# Patient Record
Sex: MALE | Race: WHITE
[De-identification: names, ages, dates, MRNs, and addresses within clinical notes are randomized per-mention and may not be internally consistent; named-entity substitution may affect disease eponyms.]

---

## 2017-12-19 ENCOUNTER — HOSPITAL ENCOUNTER (EMERGENCY)
Dept: HOSPITAL 8 - ED | Age: 42
Discharge: HOME | End: 2017-12-19
Payer: COMMERCIAL

## 2017-12-19 VITALS — BODY MASS INDEX: 33.09 KG/M2 | HEIGHT: 72 IN | WEIGHT: 244.27 LBS

## 2017-12-19 VITALS — DIASTOLIC BLOOD PRESSURE: 84 MMHG | SYSTOLIC BLOOD PRESSURE: 131 MMHG

## 2017-12-19 DIAGNOSIS — R07.89: Primary | ICD-10-CM

## 2017-12-19 DIAGNOSIS — Z88.0: ICD-10-CM

## 2017-12-19 LAB
AST SERPL-CCNC: 39 U/L (ref 15–37)
BUN SERPL-MCNC: 16 MG/DL (ref 7–18)
HCT VFR BLD CALC: 46.3 % (ref 39.2–51.8)
HGB BLD-MCNC: 16.1 G/DL (ref 13.7–18)
IS PT STATUS REG ER OR PRE ER?: YES
WBC # BLD AUTO: 7.7 X10^3/UL (ref 3.4–10)

## 2017-12-19 PROCEDURE — 93005 ELECTROCARDIOGRAM TRACING: CPT

## 2017-12-19 PROCEDURE — 36415 COLL VENOUS BLD VENIPUNCTURE: CPT

## 2017-12-19 PROCEDURE — 99285 EMERGENCY DEPT VISIT HI MDM: CPT

## 2017-12-19 PROCEDURE — 83690 ASSAY OF LIPASE: CPT

## 2017-12-19 PROCEDURE — 85025 COMPLETE CBC W/AUTO DIFF WBC: CPT

## 2017-12-19 PROCEDURE — 84484 ASSAY OF TROPONIN QUANT: CPT

## 2017-12-19 PROCEDURE — 80053 COMPREHEN METABOLIC PANEL: CPT

## 2021-10-19 ENCOUNTER — TELEPHONE (OUTPATIENT)
Dept: SCHEDULING | Facility: IMAGING CENTER | Age: 46
End: 2021-10-19

## 2021-11-22 ENCOUNTER — OFFICE VISIT (OUTPATIENT)
Dept: MEDICAL GROUP | Facility: MEDICAL CENTER | Age: 46
End: 2021-11-22
Payer: COMMERCIAL

## 2021-11-22 VITALS
OXYGEN SATURATION: 92 % | SYSTOLIC BLOOD PRESSURE: 140 MMHG | BODY MASS INDEX: 34.13 KG/M2 | TEMPERATURE: 97.7 F | HEART RATE: 72 BPM | HEIGHT: 72 IN | DIASTOLIC BLOOD PRESSURE: 90 MMHG | WEIGHT: 252 LBS

## 2021-11-22 DIAGNOSIS — Z87.39 HISTORY OF GOUT: ICD-10-CM

## 2021-11-22 DIAGNOSIS — Z23 NEED FOR VACCINATION: ICD-10-CM

## 2021-11-22 DIAGNOSIS — K43.9 ABDOMINAL WALL HERNIA: ICD-10-CM

## 2021-11-22 DIAGNOSIS — Z13.6 SCREENING FOR CARDIOVASCULAR CONDITION: ICD-10-CM

## 2021-11-22 DIAGNOSIS — Z87.898 HISTORY OF HEARTBURN: ICD-10-CM

## 2021-11-22 DIAGNOSIS — Z00.00 WELLNESS EXAMINATION: ICD-10-CM

## 2021-11-22 DIAGNOSIS — I10 PRIMARY HYPERTENSION: ICD-10-CM

## 2021-11-22 PROCEDURE — 99214 OFFICE O/P EST MOD 30 MIN: CPT | Mod: 25 | Performed by: PHYSICIAN ASSISTANT

## 2021-11-22 PROCEDURE — 90471 IMMUNIZATION ADMIN: CPT | Performed by: PHYSICIAN ASSISTANT

## 2021-11-22 PROCEDURE — 90686 IIV4 VACC NO PRSV 0.5 ML IM: CPT | Performed by: PHYSICIAN ASSISTANT

## 2021-11-22 RX ORDER — CEFDINIR 300 MG/1
CAPSULE ORAL
COMMUNITY
Start: 2021-10-21 | End: 2021-11-22

## 2021-11-22 RX ORDER — ALBUTEROL SULFATE 90 UG/1
AEROSOL, METERED RESPIRATORY (INHALATION)
COMMUNITY
Start: 2021-10-19 | End: 2021-11-22

## 2021-11-22 RX ORDER — AZITHROMYCIN 250 MG/1
TABLET, FILM COATED ORAL
COMMUNITY
Start: 2021-10-19 | End: 2021-11-22

## 2021-11-22 RX ORDER — OMEPRAZOLE 20 MG/1
20 CAPSULE, DELAYED RELEASE ORAL 2 TIMES DAILY
COMMUNITY

## 2021-11-22 RX ORDER — ALBUTEROL SULFATE 2.5 MG/3ML
SOLUTION RESPIRATORY (INHALATION)
COMMUNITY
Start: 2021-10-21 | End: 2021-11-22

## 2021-11-22 RX ORDER — LISINOPRIL 10 MG/1
10 TABLET ORAL DAILY
COMMUNITY
End: 2021-11-22 | Stop reason: SDUPTHER

## 2021-11-22 RX ORDER — LISINOPRIL 10 MG/1
10 TABLET ORAL DAILY
Qty: 30 TABLET | Refills: 0 | Status: SHIPPED | OUTPATIENT
Start: 2021-11-22 | End: 2021-12-16 | Stop reason: SDUPTHER

## 2021-11-22 ASSESSMENT — ANXIETY QUESTIONNAIRES
3. WORRYING TOO MUCH ABOUT DIFFERENT THINGS: NOT AT ALL
7. FEELING AFRAID AS IF SOMETHING AWFUL MIGHT HAPPEN: NOT AT ALL
2. NOT BEING ABLE TO STOP OR CONTROL WORRYING: NOT AT ALL
4. TROUBLE RELAXING: NOT AT ALL
1. FEELING NERVOUS, ANXIOUS, OR ON EDGE: NOT AT ALL
5. BEING SO RESTLESS THAT IT IS HARD TO SIT STILL: NOT AT ALL
6. BECOMING EASILY ANNOYED OR IRRITABLE: NOT AT ALL
GAD7 TOTAL SCORE: 0
IF YOU CHECKED OFF ANY PROBLEMS ON THIS QUESTIONNAIRE, HOW DIFFICULT HAVE THESE PROBLEMS MADE IT FOR YOU TO DO YOUR WORK, TAKE CARE OF THINGS AT HOME, OR GET ALONG WITH OTHER PEOPLE: NOT DIFFICULT AT ALL

## 2021-11-22 ASSESSMENT — PATIENT HEALTH QUESTIONNAIRE - PHQ9: CLINICAL INTERPRETATION OF PHQ2 SCORE: 0

## 2021-11-22 NOTE — ASSESSMENT & PLAN NOTE
Dx with HTN about 3 years ago. Runs in the family. Took medication for about 1.5 years. Ran out about 18 months ago. No headache, dizziness. No chest pain, SOB or difficulty breathing. Would like to restart medication.

## 2021-11-22 NOTE — ASSESSMENT & PLAN NOTE
Prescribed omeprazole at 20mg daily. Did go to [facility] and had upper endoscopy few years ago. Was told it was abnormal - a lot of inflammation but no Narayanan's esophagus. At one point got gout, once in foot, once in knee, once in big toe. Worried that maybe the omeprazole could have caused the gout attack. Started taking tart cherry extract gummies - last gout attack was June 2021.

## 2021-11-22 NOTE — LETTER
[facility]  Fabi Hurd P.A.-C.  [address]  [place] [address]  Fax: [phone]   Authorization for Release/Disclosure of   Protected Health Information   Name: JAE ROONEY : 1975 SSN: [id number]   Address: [address] [place] [address] [place] [address] Phone:    [phone] (home)    I authorize the entity listed below to release/disclose the PHI below to:   [facility]/Fabi Hurd P.A.-C. and Fabi Hurd P.A.-C.   Provider or Entity Name:     Address   City, State, Zip   Phone:      Fax:     Reason for request: continuity of care   Information to be released:    [  ] LAST COLONOSCOPY,  including any PATH REPORT and follow-up  [  ] LAST FIT/COLOGUARD RESULT [  ] LAST DEXA  [  ] LAST MAMMOGRAM  [  ] LAST PAP  [  ] LAST LABS [  ] RETINA EXAM REPORT  [  ] IMMUNIZATION RECORDS  [  ] Release all info      [  ] Check here and initial the line next to each item to release ALL health information INCLUDING  _____ Care and treatment for drug and / or alcohol abuse  _____ HIV testing, infection status, or AIDS  _____ Genetic Testing    DATES OF SERVICE OR TIME PERIOD TO BE DISCLOSED: _____________  I understand and acknowledge that:  * This Authorization may be revoked at any time by you in writing, except if your health information has already been used or disclosed.  * Your health information that will be used or disclosed as a result of you signing this authorization could be re-disclosed by the recipient. If this occurs, your re-disclosed health information may no longer be protected by State or Federal laws.  * You may refuse to sign this Authorization. Your refusal will not affect your ability to obtain treatment.  * This Authorization becomes effective upon signing and will  on (date) __________.      If no date is indicated, this Authorization will  one (1) year from the signature date.    Name: Jae Rooney    Signature:   Date:     2021       PLEASE FAX REQUESTED  RECORDS BACK TO: [phone]

## 2021-11-22 NOTE — LETTER
[facility]  Fabi Hurd P.A.-C.  [address]  [place] [address]  Fax: [phone]   Authorization for Release/Disclosure of   Protected Health Information   Name: JAE ROONEY : 1975 SSN: [id number]   Address: [address] [place] [address] [place] [address] Phone:    [phone] (home)    I authorize the entity listed below to release/disclose the PHI below to:   [facility]/Fabi Hurd P.A.-C. and Fabi Hurd P.A.-C.   Provider or Entity Name:     Address   City, State, Zip   Phone:      Fax:     Reason for request: continuity of care   Information to be released:    [  ] LAST COLONOSCOPY,  including any PATH REPORT and follow-up  [  ] LAST FIT/COLOGUARD RESULT [  ] LAST DEXA  [  ] LAST MAMMOGRAM  [  ] LAST PAP  [  ] LAST LABS [  ] RETINA EXAM REPORT  [  ] IMMUNIZATION RECORDS  [  ] Release all info      [  ] Check here and initial the line next to each item to release ALL health information INCLUDING  _____ Care and treatment for drug and / or alcohol abuse  _____ HIV testing, infection status, or AIDS  _____ Genetic Testing    DATES OF SERVICE OR TIME PERIOD TO BE DISCLOSED: _____________  I understand and acknowledge that:  * This Authorization may be revoked at any time by you in writing, except if your health information has already been used or disclosed.  * Your health information that will be used or disclosed as a result of you signing this authorization could be re-disclosed by the recipient. If this occurs, your re-disclosed health information may no longer be protected by State or Federal laws.  * You may refuse to sign this Authorization. Your refusal will not affect your ability to obtain treatment.  * This Authorization becomes effective upon signing and will  on (date) __________.      If no date is indicated, this Authorization will  one (1) year from the signature date.    Name: Jae Rooney    Signature:   Date:     2021       PLEASE FAX REQUESTED  RECORDS BACK TO: [phone]

## 2021-11-23 NOTE — PROGRESS NOTES
Chief Complaint   Patient presents with   • Establish Care     Hx of Htn, requesting to re-start Lisinopril   • Medication Management     Taking Omeprazole, has concerns it may be causing gout flare-up   • Requesting Labs     Annual, check kidney function       HISTORY OF THE PRESENT ILLNESS: This is a 46 y.o. male new patient to our practice. This pleasant patient is here today to establish care. Wife is seen in our office. Works for a [employer] as a . Non-smoker.     Primary hypertension  Dx with HTN about 3 years ago. Runs in the family. Took medication for about 1.5 years. Ran out about 18 months ago. No headache, dizziness. No chest pain, SOB or difficulty breathing. Would like to restart medication.    History of heartburn  Prescribed omeprazole at 20mg daily. Did go to [facility] and had upper endoscopy few years ago. Was told it was abnormal - a lot of inflammation but no Narayanan's esophagus. At one point got gout, once in foot, once in knee, once in big toe. Worried that maybe the omeprazole could have caused the gout attack. Started taking tart cherry extract gummies - last gout attack was 2021.     Abdominal wall hernia  Years ago, lifting heavy luggage, tear/hernia upper abdomen, not painful      Past Medical History:   Diagnosis Date   • Glaucoma    • Gout    • Hyperlipidemia    • Hypertension        Past Surgical History:   Procedure Laterality Date   • ANAL FISTULOTOMY     • CHOLECYSTECTOMY         Family Status   Relation Name Status   • Mo  Alive        Melanoma   • Fa          Skin   • Sis  Alive   • Bro  Alive     Family History   Problem Relation Age of Onset   • Hypertension Mother    • Cancer Mother    • Cancer Father        Social History     Tobacco Use   • Smoking status: Never Smoker   • Smokeless tobacco: Never Used   Vaping Use   • Vaping Use: Never used   Substance Use Topics   • Alcohol use: Yes     Comment: 2-3 times a week   • Drug use:  "Never       Allergies: Other environmental and Penicillins    Current Outpatient Medications Ordered in Epic   Medication Sig Dispense Refill   • omeprazole (PRILOSEC) 20 MG delayed-release capsule Take 20 mg by mouth 2 times a day.     • lisinopril (PRINIVIL) 10 MG Tab Take 1 Tablet by mouth every day. 30 Tablet 0     No current Epic-ordered facility-administered medications on file.       Review of Systems   Constitutional: Negative for fever, chills, weight loss and malaise/fatigue.   HENT: Negative for ear pain, nosebleeds, congestion, sore throat and neck pain.    Eyes: Negative for blurred vision.   Respiratory: Negative for cough, sputum production, shortness of breath and wheezing.    Cardiovascular: Negative for chest pain, palpitations, orthopnea and leg swelling.   Gastrointestinal: Negative for heartburn, nausea, vomiting and abdominal pain.   Genitourinary: Negative for dysuria, urgency and frequency.   Musculoskeletal: Negative for myalgias, back pain and joint pain.   Skin: Negative for rash and itching.   Neurological: Negative for dizziness, tingling, tremors, sensory change, focal weakness and headaches.   Endo/Heme/Allergies: Does not bruise/bleed easily.   Psychiatric/Behavioral: Negative for depression, anxiety, or memory loss.     All other systems reviewed and are negative except as in HPI.    Exam: /90   Pulse 72   Temp 36.5 °C (97.7 °F) (Temporal)   Ht 1.816 m (5' 11.5\")   Wt 114 kg (252 lb)   SpO2 92%   General: Normal appearing. No distress.  Neck:  Thyroid is not enlarged.  Pulmonary: Clear to ausculation.  Normal effort. No rales, ronchi, or wheezing.  Cardiovascular: Regular rate and rhythm without murmur. Carotid and radial pulses are intact and equal bilaterally.  Skin: Warm and dry.  No obvious lesions.  Musculoskeletal: Normal gait. No extremity cyanosis, clubbing, or edema.  Psych: Normal mood and affect. Alert and oriented x3. Judgment and insight is " normal.      Assessment/Plan  1. Primary hypertension  lisinopril (PRINIVIL) 10 MG Tab   2. Need for vaccination  INFLUENZA VACCINE QUAD INJ (PF)   3. History of heartburn     4. Wellness examination  CBC WITH DIFFERENTIAL    Comp Metabolic Panel    TSH WITH REFLEX TO FT4   5. Screening for cardiovascular condition  Lipid Profile   6. History of gout  URIC ACID   7. Abdominal wall hernia

## 2021-12-01 ENCOUNTER — HOSPITAL ENCOUNTER (OUTPATIENT)
Dept: LAB | Facility: MEDICAL CENTER | Age: 46
End: 2021-12-01
Attending: PHYSICIAN ASSISTANT
Payer: COMMERCIAL

## 2021-12-01 DIAGNOSIS — Z87.39 HISTORY OF GOUT: ICD-10-CM

## 2021-12-01 DIAGNOSIS — Z00.00 WELLNESS EXAMINATION: ICD-10-CM

## 2021-12-01 DIAGNOSIS — Z13.6 SCREENING FOR CARDIOVASCULAR CONDITION: ICD-10-CM

## 2021-12-01 LAB
ALBUMIN SERPL BCP-MCNC: 5.2 G/DL (ref 3.2–4.9)
ALBUMIN/GLOB SERPL: 2.4 G/DL
ALP SERPL-CCNC: 72 U/L (ref 30–99)
ANION GAP SERPL CALC-SCNC: 15 MMOL/L (ref 7–16)
BILIRUB SERPL-MCNC: 0.6 MG/DL (ref 0.1–1.5)
BUN SERPL-MCNC: 14 MG/DL (ref 8–22)
CALCIUM SERPL-MCNC: 9.1 MG/DL (ref 8.5–10.5)
CHLORIDE SERPL-SCNC: 103 MMOL/L (ref 96–112)
CHOLEST SERPL-MCNC: 260 MG/DL (ref 100–199)
CO2 SERPL-SCNC: 21 MMOL/L (ref 20–33)
CREAT SERPL-MCNC: 0.83 MG/DL (ref 0.5–1.4)
FASTING STATUS PATIENT QL REPORTED: NORMAL
GLOBULIN SER CALC-MCNC: 2.2 G/DL (ref 1.9–3.5)
GLUCOSE SERPL-MCNC: 104 MG/DL (ref 65–99)
HDLC SERPL-MCNC: 35 MG/DL
LDLC SERPL CALC-MCNC: 156 MG/DL
PROT SERPL-MCNC: 7.4 G/DL (ref 6–8.2)
SODIUM SERPL-SCNC: 139 MMOL/L (ref 135–145)
TRIGL SERPL-MCNC: 345 MG/DL (ref 0–149)
TSH SERPL DL<=0.005 MIU/L-ACNC: 2.96 UIU/ML (ref 0.38–5.33)
URATE SERPL-MCNC: 9 MG/DL (ref 2.5–8.3)

## 2021-12-01 PROCEDURE — 80061 LIPID PANEL: CPT

## 2021-12-01 PROCEDURE — 36415 COLL VENOUS BLD VENIPUNCTURE: CPT

## 2021-12-01 PROCEDURE — 84443 ASSAY THYROID STIM HORMONE: CPT

## 2021-12-01 PROCEDURE — 80053 COMPREHEN METABOLIC PANEL: CPT

## 2021-12-01 PROCEDURE — 85025 COMPLETE CBC W/AUTO DIFF WBC: CPT

## 2021-12-01 PROCEDURE — 84550 ASSAY OF BLOOD/URIC ACID: CPT

## 2021-12-09 ENCOUNTER — HOSPITAL ENCOUNTER (OUTPATIENT)
Dept: LAB | Facility: MEDICAL CENTER | Age: 46
End: 2021-12-09
Attending: PHYSICIAN ASSISTANT
Payer: COMMERCIAL

## 2021-12-09 LAB
ALT SERPL-CCNC: 114 U/L (ref 2–50)
AST SERPL-CCNC: 42 U/L (ref 12–45)
BASOPHILS # BLD AUTO: 0.8 % (ref 0–1.8)
BASOPHILS # BLD: 0.06 K/UL (ref 0–0.12)
EOSINOPHIL # BLD AUTO: 0.34 K/UL (ref 0–0.51)
EOSINOPHIL NFR BLD: 4.5 % (ref 0–6.9)
ERYTHROCYTE [DISTWIDTH] IN BLOOD BY AUTOMATED COUNT: 44.4 FL (ref 35.9–50)
HCT VFR BLD AUTO: 45.7 % (ref 42–52)
HGB BLD-MCNC: 15.7 G/DL (ref 14–18)
IMM GRANULOCYTES # BLD AUTO: 0.04 K/UL (ref 0–0.11)
IMM GRANULOCYTES NFR BLD AUTO: 0.5 % (ref 0–0.9)
LYMPHOCYTES # BLD AUTO: 2.39 K/UL (ref 1–4.8)
LYMPHOCYTES NFR BLD: 31.7 % (ref 22–41)
MCH RBC QN AUTO: 31.7 PG (ref 27–33)
MCHC RBC AUTO-ENTMCNC: 34.4 G/DL (ref 33.7–35.3)
MCV RBC AUTO: 92.1 FL (ref 81.4–97.8)
MONOCYTES # BLD AUTO: 0.57 K/UL (ref 0–0.85)
MONOCYTES NFR BLD AUTO: 7.6 % (ref 0–13.4)
NEUTROPHILS # BLD AUTO: 4.14 K/UL (ref 1.82–7.42)
NEUTROPHILS NFR BLD: 54.9 % (ref 44–72)
NRBC # BLD AUTO: 0 K/UL
NRBC BLD-RTO: 0 /100 WBC
PLATELET # BLD AUTO: 234 K/UL (ref 164–446)
PMV BLD AUTO: 9.9 FL (ref 9–12.9)
POTASSIUM SERPL-SCNC: 4.5 MMOL/L (ref 3.6–5.5)
RBC # BLD AUTO: 4.96 M/UL (ref 4.7–6.1)
WBC # BLD AUTO: 7.5 K/UL (ref 4.8–10.8)

## 2021-12-09 PROCEDURE — 84450 TRANSFERASE (AST) (SGOT): CPT

## 2021-12-09 PROCEDURE — 85025 COMPLETE CBC W/AUTO DIFF WBC: CPT

## 2021-12-09 PROCEDURE — 84460 ALANINE AMINO (ALT) (SGPT): CPT

## 2021-12-09 PROCEDURE — 84132 ASSAY OF SERUM POTASSIUM: CPT

## 2021-12-15 ENCOUNTER — OFFICE VISIT (OUTPATIENT)
Dept: MEDICAL GROUP | Facility: MEDICAL CENTER | Age: 46
End: 2021-12-15
Payer: COMMERCIAL

## 2021-12-15 VITALS
BODY MASS INDEX: 34.44 KG/M2 | TEMPERATURE: 95.2 F | HEIGHT: 71 IN | OXYGEN SATURATION: 93 % | HEART RATE: 98 BPM | SYSTOLIC BLOOD PRESSURE: 116 MMHG | WEIGHT: 246 LBS | DIASTOLIC BLOOD PRESSURE: 78 MMHG

## 2021-12-15 DIAGNOSIS — I10 PRIMARY HYPERTENSION: ICD-10-CM

## 2021-12-15 DIAGNOSIS — Z91.89 OTHER SPECIFIED PERSONAL RISK FACTORS, NOT ELSEWHERE CLASSIFIED: ICD-10-CM

## 2021-12-15 DIAGNOSIS — Z82.49 FAMILY HISTORY OF HEART DISEASE: ICD-10-CM

## 2021-12-15 DIAGNOSIS — E79.0 ELEVATED URIC ACID IN BLOOD: ICD-10-CM

## 2021-12-15 DIAGNOSIS — Z87.39 HISTORY OF GOUT: ICD-10-CM

## 2021-12-15 DIAGNOSIS — R73.9 BLOOD GLUCOSE ELEVATED: ICD-10-CM

## 2021-12-15 DIAGNOSIS — E78.2 MIXED HYPERLIPIDEMIA: ICD-10-CM

## 2021-12-15 PROCEDURE — 99214 OFFICE O/P EST MOD 30 MIN: CPT | Performed by: PHYSICIAN ASSISTANT

## 2021-12-15 RX ORDER — NAPROXEN 375 MG/1
375 TABLET ORAL 2 TIMES DAILY WITH MEALS
Qty: 60 TABLET | Refills: 0 | Status: SHIPPED | OUTPATIENT
Start: 2021-12-15 | End: 2022-04-07

## 2021-12-15 RX ORDER — ALLOPURINOL 100 MG/1
100 TABLET ORAL DAILY
Qty: 30 TABLET | Refills: 2 | Status: SHIPPED
Start: 2021-12-15 | End: 2022-04-07

## 2021-12-15 ASSESSMENT — FIBROSIS 4 INDEX: FIB4 SCORE: 0.74

## 2021-12-15 NOTE — ASSESSMENT & PLAN NOTE
Ref Range & Units 2 wk ago   Cholesterol,Tot 100 - 199 mg/dL 260 High     Triglycerides 0 - 149 mg/dL 345 High     HDL >=40 mg/dL 35 Abnormal     LDL <100 mg/dL 156      Dad had bypass surgery around age 55-57, will check ct calcium scoring then f/u with discussion on statin

## 2021-12-15 NOTE — PROGRESS NOTES
"Subjective:   Jae Cedillo is a 46 y.o. male here today for f/u HTN, lab review    Chief Complaint   Patient presents with   • Hypertension     Follow up       Elevated uric acid in blood  9.0. h/o gout attacks. Will start allopurinol and naproxen, repeat uric acid 1-2 months    Mixed hyperlipidemia   Ref Range & Units 2 wk ago   Cholesterol,Tot 100 - 199 mg/dL 260 High     Triglycerides 0 - 149 mg/dL 345 High     HDL >=40 mg/dL 35 Abnormal     LDL <100 mg/dL 156      Dad had bypass surgery around age 55-57, will check ct calcium scoring then f/u with discussion on statin    Primary hypertension  Improved on current, recent gfr, bun and creatinine wnl    Blood glucose elevated  104, will check HgA1C in 3-6 months       Current medicines (including changes today)  Current Outpatient Medications   Medication Sig Dispense Refill   • allopurinol (ZYLOPRIM) 100 MG Tab Take 1 Tablet by mouth every day. 30 Tablet 2   • naproxen (NAPROSYN) 375 MG Tab Take 1 Tablet by mouth 2 times a day with meals. 60 Tablet 0   • omeprazole (PRILOSEC) 20 MG delayed-release capsule Take 20 mg by mouth 2 times a day.     • lisinopril (PRINIVIL) 10 MG Tab Take 1 Tablet by mouth every day. 30 Tablet 0     No current facility-administered medications for this visit.     He  has a past medical history of Glaucoma, Gout, Hyperlipidemia, and Hypertension.    ROS   No fever/chills. No weight change. No headache/dizziness. No focal weakness. No sore throat, nasal congestion, ear pain. No chest pain, no shortness of breath, difficulty breathing. No n/v/d/c or abdominal pain. No urinary complaint. No rash or skin lesion. No joint pain or swelling.       Objective:     /78 (BP Location: Left arm, Patient Position: Sitting, BP Cuff Size: Adult long)   Pulse 98   Temp (!) 35.1 °C (95.2 °F) (Temporal)   Ht 1.816 m (5' 11.5\")   Wt 112 kg (246 lb)   SpO2 93%  Body mass index is 33.84 kg/m².   Physical Exam:  Constitutional: WDWN, NAD  Skin: Warm, " dry, good turgor, no rashes in visible areas.  Psych: Alert and oriented x3, normal affect and mood.    Assessment and Plan:   The following treatment plan was discussed    1. Elevated uric acid in blood    - allopurinol (ZYLOPRIM) 100 MG Tab; Take 1 Tablet by mouth every day.  Dispense: 30 Tablet; Refill: 2  - naproxen (NAPROSYN) 375 MG Tab; Take 1 Tablet by mouth 2 times a day with meals.  Dispense: 60 Tablet; Refill: 0  - URIC ACID; Future    2. Mixed hyperlipidemia    - CT-HEART W/O CONT EVAL CALCIUM; Future    3. Blood glucose elevated      4. Other specified personal risk factors, not elsewhere classified    - CT-HEART W/O CONT EVAL CALCIUM; Future    5. Family history of heart disease    - allopurinol (ZYLOPRIM) 100 MG Tab; Take 1 Tablet by mouth every day.  Dispense: 30 Tablet; Refill: 2  - naproxen (NAPROSYN) 375 MG Tab; Take 1 Tablet by mouth 2 times a day with meals.  Dispense: 60 Tablet; Refill: 0  - URIC ACID; Future  - CT-HEART W/O CONT EVAL CALCIUM; Future    6. History of gout    - allopurinol (ZYLOPRIM) 100 MG Tab; Take 1 Tablet by mouth every day.  Dispense: 30 Tablet; Refill: 2  - naproxen (NAPROSYN) 375 MG Tab; Take 1 Tablet by mouth 2 times a day with meals.  Dispense: 60 Tablet; Refill: 0  - URIC ACID; Future    7. Primary hypertension    Diet and exercise discussed    Followup: Return in about 3 months (around 3/15/2022).

## 2021-12-16 DIAGNOSIS — I10 PRIMARY HYPERTENSION: ICD-10-CM

## 2021-12-17 RX ORDER — LISINOPRIL 10 MG/1
10 TABLET ORAL DAILY
Qty: 90 TABLET | Refills: 3 | Status: SHIPPED | OUTPATIENT
Start: 2021-12-17 | End: 2022-05-20 | Stop reason: SDUPTHER

## 2022-04-05 ENCOUNTER — HOSPITAL ENCOUNTER (OUTPATIENT)
Dept: LAB | Facility: MEDICAL CENTER | Age: 47
End: 2022-04-05
Attending: PHYSICIAN ASSISTANT
Payer: COMMERCIAL

## 2022-04-05 DIAGNOSIS — E79.0 ELEVATED URIC ACID IN BLOOD: ICD-10-CM

## 2022-04-05 DIAGNOSIS — Z87.39 HISTORY OF GOUT: ICD-10-CM

## 2022-04-05 DIAGNOSIS — Z82.49 FAMILY HISTORY OF HEART DISEASE: ICD-10-CM

## 2022-04-05 LAB — URATE SERPL-MCNC: 7.7 MG/DL (ref 2.5–8.3)

## 2022-04-05 PROCEDURE — 84550 ASSAY OF BLOOD/URIC ACID: CPT

## 2022-04-05 PROCEDURE — 36415 COLL VENOUS BLD VENIPUNCTURE: CPT

## 2022-04-07 ENCOUNTER — OFFICE VISIT (OUTPATIENT)
Dept: MEDICAL GROUP | Facility: MEDICAL CENTER | Age: 47
End: 2022-04-07
Payer: COMMERCIAL

## 2022-04-07 VITALS
HEART RATE: 88 BPM | SYSTOLIC BLOOD PRESSURE: 136 MMHG | OXYGEN SATURATION: 95 % | DIASTOLIC BLOOD PRESSURE: 80 MMHG | BODY MASS INDEX: 34.19 KG/M2 | HEIGHT: 71 IN | WEIGHT: 244.2 LBS | TEMPERATURE: 97.4 F

## 2022-04-07 DIAGNOSIS — I10 PRIMARY HYPERTENSION: ICD-10-CM

## 2022-04-07 DIAGNOSIS — K21.9 GASTROESOPHAGEAL REFLUX DISEASE, UNSPECIFIED WHETHER ESOPHAGITIS PRESENT: ICD-10-CM

## 2022-04-07 DIAGNOSIS — E79.0 ELEVATED URIC ACID IN BLOOD: ICD-10-CM

## 2022-04-07 PROCEDURE — 99214 OFFICE O/P EST MOD 30 MIN: CPT | Performed by: PHYSICIAN ASSISTANT

## 2022-04-07 RX ORDER — PREDNISONE 10 MG/1
TABLET ORAL
COMMUNITY
Start: 2022-03-19 | End: 2022-04-07

## 2022-04-07 RX ORDER — AZITHROMYCIN 250 MG/1
TABLET, FILM COATED ORAL
COMMUNITY
Start: 2022-03-19 | End: 2022-04-07

## 2022-04-07 ASSESSMENT — FIBROSIS 4 INDEX: FIB4 SCORE: 0.74

## 2022-04-07 ASSESSMENT — PATIENT HEALTH QUESTIONNAIRE - PHQ9: CLINICAL INTERPRETATION OF PHQ2 SCORE: 0

## 2022-04-07 NOTE — ASSESSMENT & PLAN NOTE
Last endoscopy about 4 years ago. Was advised to take 40mg omeprazole daily. Stopped doing that because he was worried it was causing his gout.     Over the last 10 days he feels heartburn pain when he lies down for more than 7 hours. Pain for about 30 minutes when he first wakes up then fine after that.     Taking 20mg omeprazole daily  Started allopurinol in December    Not taking any aspirin, NSAID, naproxen    Loves spicy food.

## 2022-04-07 NOTE — PATIENT INSTRUCTIONS
Gastroesophageal Reflux Disease, Adult  Gastroesophageal reflux (MARGARETH) happens when acid from the stomach flows up into the tube that connects the mouth and the stomach (esophagus). Normally, food travels down the esophagus and stays in the stomach to be digested. However, when a person has MARGARETH, food and stomach acid sometimes move back up into the esophagus. If this becomes a more serious problem, the person may be diagnosed with a disease called gastroesophageal reflux disease (GERD). GERD occurs when the reflux:  · Happens often.  · Causes frequent or severe symptoms.  · Causes problems such as damage to the esophagus.  When stomach acid comes in contact with the esophagus, the acid may cause soreness (inflammation) in the esophagus. Over time, GERD may create small holes (ulcers) in the lining of the esophagus.  What are the causes?  This condition is caused by a problem with the muscle between the esophagus and the stomach (lower esophageal sphincter, or LES). Normally, the LES muscle closes after food passes through the esophagus to the stomach. When the LES is weakened or abnormal, it does not close properly, and that allows food and stomach acid to go back up into the esophagus.  The LES can be weakened by certain dietary substances, medicines, and medical conditions, including:  · Tobacco use.  · Pregnancy.  · Having a hiatal hernia.  · Alcohol use.  · Certain foods and beverages, such as coffee, chocolate, onions, and peppermint.  What increases the risk?  You are more likely to develop this condition if you:  · Have an increased body weight.  · Have a connective tissue disorder.  · Use NSAID medicines.  What are the signs or symptoms?  Symptoms of this condition include:  · Heartburn.  · Difficult or painful swallowing.  · The feeling of having a lump in the throat.  · A bitter taste in the mouth.  · Bad breath.  · Having a large amount of saliva.  · Having an upset or bloated  stomach.  · Belching.  · Chest pain. Different conditions can cause chest pain. Make sure you see your health care provider if you experience chest pain.  · Shortness of breath or wheezing.  · Ongoing (chronic) cough or a night-time cough.  · Wearing away of tooth enamel.  · Weight loss.  How is this diagnosed?  Your health care provider will take a medical history and perform a physical exam. To determine if you have mild or severe GERD, your health care provider may also monitor how you respond to treatment. You may also have tests, including:  · A test to examine your stomach and esophagus with a small camera (endoscopy).  · A test that measures the acidity level in your esophagus.  · A test that measures how much pressure is on your esophagus.  · A barium swallow or modified barium swallow test to show the shape, size, and functioning of your esophagus.  How is this treated?  The goal of treatment is to help relieve your symptoms and to prevent complications. Treatment for this condition may vary depending on how severe your symptoms are. Your health care provider may recommend:  · Changes to your diet.  · Medicine.  · Surgery.  Follow these instructions at home:  Eating and drinking    · Follow a diet as recommended by your health care provider. This may involve avoiding foods and drinks such as:  ? Coffee and tea (with or without caffeine).  ? Drinks that contain alcohol.  ? Energy drinks and sports drinks.  ? Carbonated drinks or sodas.  ? Chocolate and cocoa.  ? Peppermint and mint flavorings.  ? Garlic and onions.  ? Horseradish.  ? Spicy and acidic foods, including peppers, chili powder, briggs powder, vinegar, hot sauces, and barbecue sauce.  ? Citrus fruit juices and citrus fruits, such as oranges, debra, and limes.  ? Tomato-based foods, such as red sauce, chili, salsa, and pizza with red sauce.  ? Fried and fatty foods, such as donuts, french fries, potato chips, and high-fat dressings.  ? High-fat  meats, such as hot dogs and fatty cuts of red and white meats, such as rib eye steak, sausage, ham, and bowen.  ? High-fat dairy items, such as whole milk, butter, and cream cheese.  · Eat small, frequent meals instead of large meals.  · Avoid drinking large amounts of liquid with your meals.  · Avoid eating meals during the 2-3 hours before bedtime.  · Avoid lying down right after you eat.  · Do not exercise right after you eat.  Lifestyle    · Do not use any products that contain nicotine or tobacco, such as cigarettes, e-cigarettes, and chewing tobacco. If you need help quitting, ask your health care provider.  · Try to reduce your stress by using methods such as yoga or meditation. If you need help reducing stress, ask your health care provider.  · If you are overweight, reduce your weight to an amount that is healthy for you. Ask your health care provider for guidance about a safe weight loss goal.  General instructions  · Pay attention to any changes in your symptoms.  · Take over-the-counter and prescription medicines only as told by your health care provider. Do not take aspirin, ibuprofen, or other NSAIDs unless your health care provider told you to do so.  · Wear loose-fitting clothing. Do not wear anything tight around your waist that causes pressure on your abdomen.  · Raise (elevate) the head of your bed about 6 inches (15 cm).  · Avoid bending over if this makes your symptoms worse.  · Keep all follow-up visits as told by your health care provider. This is important.  Contact a health care provider if:  · You have:  ? New symptoms.  ? Unexplained weight loss.  ? Difficulty swallowing or it hurts to swallow.  ? Wheezing or a persistent cough.  ? A hoarse voice.  · Your symptoms do not improve with treatment.  Get help right away if you:  · Have pain in your arms, neck, jaw, teeth, or back.  · Feel sweaty, dizzy, or light-headed.  · Have chest pain or shortness of breath.  · Vomit and your vomit looks  like blood or coffee grounds.  · Faint.  · Have stool that is bloody or black.  · Cannot swallow, drink, or eat.  Summary  · Gastroesophageal reflux happens when acid from the stomach flows up into the esophagus. GERD is a disease in which the reflux happens often, causes frequent or severe symptoms, or causes problems such as damage to the esophagus.  · Treatment for this condition may vary depending on how severe your symptoms are. Your health care provider may recommend diet and lifestyle changes, medicine, or surgery.  · Contact a health care provider if you have new or worsening symptoms.  · Take over-the-counter and prescription medicines only as told by your health care provider. Do not take aspirin, ibuprofen, or other NSAIDs unless your health care provider told you to do so.  · Keep all follow-up visits as told by your health care provider. This is important.  This information is not intended to replace advice given to you by your health care provider. Make sure you discuss any questions you have with your health care provider.  Document Released: 09/27/2006 Document Revised: 06/26/2019 Document Reviewed: 06/26/2019  Elsevier Patient Education © 2020 [employer]

## 2022-04-07 NOTE — PROGRESS NOTES
"Subjective:   Jae Cedillo is a 46 y.o. male here today for f/u and worsening GERD symptoms    Chief Complaint   Patient presents with   • Referral Needed     DHA, severe acid reflux   • Lab Results     Follow up   • Medication Management     Discuss Nexium and allopurinol       Gastroesophageal reflux disease  Last endoscopy about 4 years ago. Was advised to take 40mg omeprazole daily. Stopped doing that because he was worried it was causing his gout.     Over the last 10 days he feels heartburn pain when he lies down for more than 7 hours. Pain for about 30 minutes when he first wakes up then fine after that.     Taking 20mg omeprazole daily  Started allopurinol in December    Not taking any aspirin, NSAID, naproxen    Loves spicy food.     Elevated uric acid in blood  Improved on allopurinol. Stopped allopurinol because of concern about making his GERD worse    Primary hypertension  Taking lisinopril as prescribed       Current medicines (including changes today)  Current Outpatient Medications   Medication Sig Dispense Refill   • lisinopril (PRINIVIL) 10 MG Tab Take 1 Tablet by mouth every day. 90 Tablet 3   • omeprazole (PRILOSEC) 20 MG delayed-release capsule Take 20 mg by mouth 2 times a day.       No current facility-administered medications for this visit.     He  has a past medical history of Glaucoma, Gout, Hyperlipidemia, and Hypertension.    ROS   No fever/chills. No weight change. No headache/dizziness. No focal weakness. No sore throat, nasal congestion, ear pain. No chest pain, no shortness of breath, difficulty breathing.  No urinary complaint. No rash or skin lesion. No joint pain or swelling.       Objective:     /80 (BP Location: Left arm, Patient Position: Sitting, BP Cuff Size: Adult long)   Pulse 88   Temp 36.3 °C (97.4 °F) (Temporal)   Ht 1.816 m (5' 11.5\")   Wt 111 kg (244 lb 3.2 oz)   SpO2 95%  Body mass index is 33.59 kg/m².   Physical Exam:  Constitutional: WDWN, NAD  Skin: " Warm, dry, good turgor, no rashes in visible areas.  Respiratory: Unlabored respiratory effort, lungs clear to auscultation, no wheezes, no ronchi.  Cardiovascular: Normal S1, S2, no murmur, no leg edema.  Psych: Alert and oriented x3, normal affect and mood.    Assessment and Plan:   The following treatment plan was discussed    1. Gastroesophageal reflux disease, unspecified whether esophagitis present    - Referral to Gastroenterology    2. Elevated uric acid in blood      3. Primary hypertension        Followup: 3 months, will need to establish care with new provider as I am leaving [facility]

## 2022-04-25 RX ORDER — ALLOPURINOL 100 MG/1
TABLET ORAL
Qty: 30 TABLET | Refills: 2 | Status: SHIPPED | OUTPATIENT
Start: 2022-04-25 | End: 2022-05-20 | Stop reason: SDUPTHER

## 2022-05-03 ENCOUNTER — OFFICE VISIT (OUTPATIENT)
Dept: MEDICAL GROUP | Facility: MEDICAL CENTER | Age: 47
End: 2022-05-03
Payer: COMMERCIAL

## 2022-05-03 VITALS
TEMPERATURE: 97.8 F | RESPIRATION RATE: 12 BRPM | HEIGHT: 71 IN | HEART RATE: 86 BPM | BODY MASS INDEX: 33.6 KG/M2 | WEIGHT: 240 LBS | OXYGEN SATURATION: 96 % | DIASTOLIC BLOOD PRESSURE: 84 MMHG | SYSTOLIC BLOOD PRESSURE: 136 MMHG

## 2022-05-03 DIAGNOSIS — R42 DIZZINESS: ICD-10-CM

## 2022-05-03 DIAGNOSIS — I10 PRIMARY HYPERTENSION: ICD-10-CM

## 2022-05-03 DIAGNOSIS — R73.9 BLOOD GLUCOSE ELEVATED: ICD-10-CM

## 2022-05-03 DIAGNOSIS — K43.9 ABDOMINAL WALL HERNIA: ICD-10-CM

## 2022-05-03 LAB
HBA1C MFR BLD: 5.2 % (ref 0–5.6)
INT CON NEG: NEGATIVE
INT CON POS: POSITIVE

## 2022-05-03 PROCEDURE — 83036 HEMOGLOBIN GLYCOSYLATED A1C: CPT | Performed by: PHYSICIAN ASSISTANT

## 2022-05-03 PROCEDURE — 99214 OFFICE O/P EST MOD 30 MIN: CPT | Performed by: PHYSICIAN ASSISTANT

## 2022-05-03 ASSESSMENT — FIBROSIS 4 INDEX: FIB4 SCORE: 0.74

## 2022-05-03 NOTE — PROGRESS NOTES
"Subjective:   Jae Cedillo is a 46 y.o. male here today for new dizziness    Chief Complaint   Patient presents with   • Other     For 2 weeks feeling dizzy and vertigo        Dizziness  2 weeks. No URI prior. Dizziness/lightheaded. No hearing loss, tinnitus. No headache. No neck pain or stiffness. No general or focal weakness. Big conference, lots of stress. Probably stress induced. Worse in the most stressful moments. Worse standing up, sometimes happens driving, also can feel it just sitting. First time noticed it getting out of care, walking around super light headed. Lasting a long time. Not room spinning but light headed. No vision change, chest pain, SOB, nausea or other symptoms at the time he has the dizziness symptoms.    Blood glucose elevated  In office A1C at 5.2    Abdominal wall hernia  Chronic, would like consult with gen surg to discuss option of repair, no new pain    Primary hypertension  Well controlled on current, no new concerns       Current medicines (including changes today)  Current Outpatient Medications   Medication Sig Dispense Refill   • allopurinol (ZYLOPRIM) 100 MG Tab TAKE ONE TABLET BY MOUTH EVERY DAY 30 Tablet 2   • lisinopril (PRINIVIL) 10 MG Tab Take 1 Tablet by mouth every day. 90 Tablet 3   • omeprazole (PRILOSEC) 20 MG delayed-release capsule Take 20 mg by mouth 2 times a day.       No current facility-administered medications for this visit.     He  has a past medical history of Glaucoma, Gout, Hyperlipidemia, and Hypertension.    ROS   No fever/chills. No weight change. No focal weakness. No sore throat, nasal congestion, ear pain. No chest pain, no shortness of breath, difficulty breathing. No n/v/d/c or abdominal pain. No urinary complaint. No rash or skin lesion. No joint pain or swelling.       Objective:     /84 (BP Location: Left arm, Patient Position: Sitting, BP Cuff Size: Adult)   Pulse 86   Temp 36.6 °C (97.8 °F) (Temporal)   Resp 12   Ht 1.803 m (5' 11\") "   Wt 109 kg (240 lb)   SpO2 96%  Body mass index is 33.47 kg/m².   Physical Exam:  Constitutional: WDWN, NAD  Skin: Warm, dry, good turgor, no rashes in visible areas.  Eye: Equal, round and reactive, conjunctiva clear, lids normal.  ENMT: Lips without lesions, good dentition, oropharynx clear.  Neck: Trachea midline, no masses, no thyromegaly. No cervical or supraclavicular lymphadenopathy  Respiratory: Unlabored respiratory effort, lungs clear to auscultation, no wheezes, no ronchi.  Cardiovascular: Normal S1, S2, no murmur, no leg edema.  Psych: Alert and oriented x3, normal affect and mood.    Orthostatics: see scanned, no significant change in pulse or BP with position change  EKG: see scanned, nsr  Assessment and Plan:   The following treatment plan was discussed    1. Dizziness    - POCT A1C  - EKG - Clinic Performed  - CBC WITH DIFFERENTIAL; Future  - Comp Metabolic Panel; Future  - TSH WITH REFLEX TO FT4; Future    2. Abdominal wall hernia    - Referral to General Surgery    3. Blood glucose elevated      4. Primary hypertension        Followup: after labs

## 2022-05-03 NOTE — ASSESSMENT & PLAN NOTE
2 weeks. No URI prior. Dizziness/lightheaded. No hearing loss, tinnitus. No headache. No neck pain or stiffness. No general or focal weakness. Big conference, lots of stress. Probably stress induced. Worse in the most stressful moments. Worse standing up, sometimes happens driving, also can feel it just sitting. First time noticed it getting out of care, walking around super light headed. Lasting a long time. Not room spinning but light headed. No vision change, chest pain, SOB, nausea or other symptoms at the time he has the dizziness symptoms.

## 2022-05-18 ENCOUNTER — HOSPITAL ENCOUNTER (OUTPATIENT)
Dept: LAB | Facility: MEDICAL CENTER | Age: 47
End: 2022-05-18
Attending: PHYSICIAN ASSISTANT
Payer: COMMERCIAL

## 2022-05-18 DIAGNOSIS — R42 DIZZINESS: ICD-10-CM

## 2022-05-18 LAB
ALBUMIN SERPL BCP-MCNC: 4.7 G/DL (ref 3.2–4.9)
ALBUMIN/GLOB SERPL: 2 G/DL
ALP SERPL-CCNC: 70 U/L (ref 30–99)
ALT SERPL-CCNC: 90 U/L (ref 2–50)
ANION GAP SERPL CALC-SCNC: 11 MMOL/L (ref 7–16)
AST SERPL-CCNC: 37 U/L (ref 12–45)
BASOPHILS # BLD AUTO: 0.8 % (ref 0–1.8)
BASOPHILS # BLD: 0.05 K/UL (ref 0–0.12)
BILIRUB SERPL-MCNC: 0.2 MG/DL (ref 0.1–1.5)
BUN SERPL-MCNC: 14 MG/DL (ref 8–22)
CALCIUM SERPL-MCNC: 9.3 MG/DL (ref 8.5–10.5)
CHLORIDE SERPL-SCNC: 103 MMOL/L (ref 96–112)
CO2 SERPL-SCNC: 25 MMOL/L (ref 20–33)
CREAT SERPL-MCNC: 0.96 MG/DL (ref 0.5–1.4)
EOSINOPHIL # BLD AUTO: 0.12 K/UL (ref 0–0.51)
EOSINOPHIL NFR BLD: 1.8 % (ref 0–6.9)
ERYTHROCYTE [DISTWIDTH] IN BLOOD BY AUTOMATED COUNT: 42.8 FL (ref 35.9–50)
GFR SERPLBLD CREATININE-BSD FMLA CKD-EPI: 99 ML/MIN/1.73 M 2
GLOBULIN SER CALC-MCNC: 2.3 G/DL (ref 1.9–3.5)
GLUCOSE SERPL-MCNC: 113 MG/DL (ref 65–99)
HCT VFR BLD AUTO: 44.4 % (ref 42–52)
HGB BLD-MCNC: 15.5 G/DL (ref 14–18)
IMM GRANULOCYTES # BLD AUTO: 0.03 K/UL (ref 0–0.11)
IMM GRANULOCYTES NFR BLD AUTO: 0.5 % (ref 0–0.9)
LYMPHOCYTES # BLD AUTO: 2.04 K/UL (ref 1–4.8)
LYMPHOCYTES NFR BLD: 31 % (ref 22–41)
MCH RBC QN AUTO: 32 PG (ref 27–33)
MCHC RBC AUTO-ENTMCNC: 34.9 G/DL (ref 33.7–35.3)
MCV RBC AUTO: 91.5 FL (ref 81.4–97.8)
MONOCYTES # BLD AUTO: 0.33 K/UL (ref 0–0.85)
MONOCYTES NFR BLD AUTO: 5 % (ref 0–13.4)
NEUTROPHILS # BLD AUTO: 4.02 K/UL (ref 1.82–7.42)
NEUTROPHILS NFR BLD: 60.9 % (ref 44–72)
NRBC # BLD AUTO: 0 K/UL
NRBC BLD-RTO: 0 /100 WBC
PLATELET # BLD AUTO: 228 K/UL (ref 164–446)
PMV BLD AUTO: 10 FL (ref 9–12.9)
POTASSIUM SERPL-SCNC: 4.6 MMOL/L (ref 3.6–5.5)
PROT SERPL-MCNC: 7 G/DL (ref 6–8.2)
RBC # BLD AUTO: 4.85 M/UL (ref 4.7–6.1)
SODIUM SERPL-SCNC: 139 MMOL/L (ref 135–145)
WBC # BLD AUTO: 6.6 K/UL (ref 4.8–10.8)

## 2022-05-18 PROCEDURE — 85025 COMPLETE CBC W/AUTO DIFF WBC: CPT

## 2022-05-18 PROCEDURE — 80053 COMPREHEN METABOLIC PANEL: CPT

## 2022-05-18 PROCEDURE — 84443 ASSAY THYROID STIM HORMONE: CPT

## 2022-05-18 PROCEDURE — 36415 COLL VENOUS BLD VENIPUNCTURE: CPT

## 2022-05-19 LAB — TSH SERPL DL<=0.005 MIU/L-ACNC: 2.32 UIU/ML (ref 0.38–5.33)

## 2022-05-20 ENCOUNTER — OFFICE VISIT (OUTPATIENT)
Dept: MEDICAL GROUP | Facility: MEDICAL CENTER | Age: 47
End: 2022-05-20
Payer: COMMERCIAL

## 2022-05-20 VITALS
BODY MASS INDEX: 34.16 KG/M2 | OXYGEN SATURATION: 95 % | HEIGHT: 71 IN | HEART RATE: 80 BPM | TEMPERATURE: 97.7 F | DIASTOLIC BLOOD PRESSURE: 86 MMHG | WEIGHT: 244 LBS | SYSTOLIC BLOOD PRESSURE: 132 MMHG

## 2022-05-20 DIAGNOSIS — R42 DIZZINESS: ICD-10-CM

## 2022-05-20 DIAGNOSIS — I10 PRIMARY HYPERTENSION: ICD-10-CM

## 2022-05-20 DIAGNOSIS — K21.9 GASTROESOPHAGEAL REFLUX DISEASE, UNSPECIFIED WHETHER ESOPHAGITIS PRESENT: ICD-10-CM

## 2022-05-20 DIAGNOSIS — E79.0 ELEVATED URIC ACID IN BLOOD: ICD-10-CM

## 2022-05-20 PROCEDURE — 99214 OFFICE O/P EST MOD 30 MIN: CPT | Performed by: PHYSICIAN ASSISTANT

## 2022-05-20 RX ORDER — LISINOPRIL 10 MG/1
10 TABLET ORAL DAILY
Qty: 90 TABLET | Refills: 1 | Status: SHIPPED | OUTPATIENT
Start: 2022-05-20 | End: 2023-05-23

## 2022-05-20 RX ORDER — ALLOPURINOL 100 MG/1
100 TABLET ORAL
Qty: 90 TABLET | Refills: 1 | Status: SHIPPED | OUTPATIENT
Start: 2022-05-20 | End: 2022-12-27 | Stop reason: SDUPTHER

## 2022-05-20 ASSESSMENT — FIBROSIS 4 INDEX: FIB4 SCORE: 0.75

## 2022-05-20 NOTE — PROGRESS NOTES
"Subjective:   Jae Cedillo is a 46 y.o. male here today for f/u dizziness, lab review    Chief Complaint   Patient presents with   • Dizziness     Follow up, has since gone away       Primary hypertension  Chronic, stable on current, no new concern    Gastroesophageal reflux disease  Seeing GI, got ok to continue omeprazole    Elevated uric acid in blood  Uric acid wnl, cont current allopurinol    Dizziness  Improved now that he is under less stress       Current medicines (including changes today)  Current Outpatient Medications   Medication Sig Dispense Refill   • allopurinol (ZYLOPRIM) 100 MG Tab Take 1 Tablet by mouth every day. 90 Tablet 1   • lisinopril (PRINIVIL) 10 MG Tab Take 1 Tablet by mouth every day. 90 Tablet 1   • omeprazole (PRILOSEC) 20 MG delayed-release capsule Take 20 mg by mouth 2 times a day.       No current facility-administered medications for this visit.     He  has a past medical history of Glaucoma, Gout, Hyperlipidemia, and Hypertension.    ROS   No fever/chills. No weight change. No headache/dizziness. No focal weakness. No sore throat, nasal congestion, ear pain. No chest pain, no shortness of breath, difficulty breathing. No n/v/d/c or abdominal pain. No urinary complaint. No rash or skin lesion. No joint pain or swelling.       Objective:     /86 (BP Location: Right arm, Patient Position: Sitting, BP Cuff Size: Adult long)   Pulse 80   Temp 36.5 °C (97.7 °F) (Temporal)   Ht 1.803 m (5' 11\")   Wt 111 kg (244 lb)   SpO2 95%  Body mass index is 34.03 kg/m².   Physical Exam:  Constitutional: WDWN, NAD  Skin: Warm, dry, good turgor, no rashes in visible areas.  Respiratory: Unlabored respiratory effort, lungs clear to auscultation, no wheezes, no ronchi.  Cardiovascular: Normal S1, S2, no murmur, no leg edema.  Psych: Alert and oriented x3, normal affect and mood.    Assessment and Plan:   The following treatment plan was discussed    1. Primary hypertension    - lisinopril " (PRINIVIL) 10 MG Tab; Take 1 Tablet by mouth every day.  Dispense: 90 Tablet; Refill: 1    2. Elevated uric acid in blood    - allopurinol (ZYLOPRIM) 100 MG Tab; Take 1 Tablet by mouth every day.  Dispense: 90 Tablet; Refill: 1    3. Gastroesophageal reflux disease, unspecified whether esophagitis present      4. Dizziness        Followup: will establish with new PCP as [facility]

## 2022-06-10 ENCOUNTER — HOSPITAL ENCOUNTER (OUTPATIENT)
Dept: RADIOLOGY | Facility: MEDICAL CENTER | Age: 47
End: 2022-06-10
Payer: COMMERCIAL

## 2022-06-10 DIAGNOSIS — R74.8 ELEVATED LIVER ENZYMES: ICD-10-CM

## 2022-06-10 PROCEDURE — 76700 US EXAM ABDOM COMPLETE: CPT

## 2022-07-24 SDOH — ECONOMIC STABILITY: HOUSING INSECURITY
IN THE LAST 12 MONTHS, WAS THERE A TIME WHEN YOU DID NOT HAVE A STEADY PLACE TO SLEEP OR SLEPT IN A SHELTER (INCLUDING NOW)?: NO

## 2022-07-24 SDOH — ECONOMIC STABILITY: TRANSPORTATION INSECURITY
IN THE PAST 12 MONTHS, HAS LACK OF RELIABLE TRANSPORTATION KEPT YOU FROM MEDICAL APPOINTMENTS, MEETINGS, WORK OR FROM GETTING THINGS NEEDED FOR DAILY LIVING?: NO

## 2022-07-24 SDOH — ECONOMIC STABILITY: HOUSING INSECURITY

## 2022-07-24 SDOH — HEALTH STABILITY: MENTAL HEALTH
STRESS IS WHEN SOMEONE FEELS TENSE, NERVOUS, ANXIOUS, OR CAN'T SLEEP AT NIGHT BECAUSE THEIR MIND IS TROUBLED. HOW STRESSED ARE YOU?: ONLY A LITTLE

## 2022-07-24 SDOH — ECONOMIC STABILITY: TRANSPORTATION INSECURITY
IN THE PAST 12 MONTHS, HAS THE LACK OF TRANSPORTATION KEPT YOU FROM MEDICAL APPOINTMENTS OR FROM GETTING MEDICATIONS?: NO

## 2022-07-24 SDOH — HEALTH STABILITY: PHYSICAL HEALTH: ON AVERAGE, HOW MANY DAYS PER WEEK DO YOU ENGAGE IN MODERATE TO STRENUOUS EXERCISE (LIKE A BRISK WALK)?: 3 DAYS

## 2022-07-24 SDOH — ECONOMIC STABILITY: INCOME INSECURITY: IN THE LAST 12 MONTHS, WAS THERE A TIME WHEN YOU WERE NOT ABLE TO PAY THE MORTGAGE OR RENT ON TIME?: NO

## 2022-07-24 SDOH — HEALTH STABILITY: PHYSICAL HEALTH: ON AVERAGE, HOW MANY MINUTES DO YOU ENGAGE IN EXERCISE AT THIS LEVEL?: 30 MIN

## 2022-07-24 SDOH — ECONOMIC STABILITY: FOOD INSECURITY: WITHIN THE PAST 12 MONTHS, THE FOOD YOU BOUGHT JUST DIDN'T LAST AND YOU DIDN'T HAVE MONEY TO GET MORE.: NEVER TRUE

## 2022-07-24 SDOH — ECONOMIC STABILITY: FOOD INSECURITY: WITHIN THE PAST 12 MONTHS, YOU WORRIED THAT YOUR FOOD WOULD RUN OUT BEFORE YOU GOT MONEY TO BUY MORE.: NEVER TRUE

## 2022-07-24 SDOH — ECONOMIC STABILITY: TRANSPORTATION INSECURITY
IN THE PAST 12 MONTHS, HAS LACK OF TRANSPORTATION KEPT YOU FROM MEETINGS, WORK, OR FROM GETTING THINGS NEEDED FOR DAILY LIVING?: NO

## 2022-07-24 SDOH — ECONOMIC STABILITY: INCOME INSECURITY: HOW HARD IS IT FOR YOU TO PAY FOR THE VERY BASICS LIKE FOOD, HOUSING, MEDICAL CARE, AND HEATING?: NOT HARD AT ALL

## 2022-07-24 ASSESSMENT — SOCIAL DETERMINANTS OF HEALTH (SDOH)
HOW OFTEN DO YOU GET TOGETHER WITH FRIENDS OR RELATIVES?: ONCE A WEEK
DO YOU BELONG TO ANY CLUBS OR ORGANIZATIONS SUCH AS CHURCH GROUPS UNIONS, FRATERNAL OR ATHLETIC GROUPS, OR SCHOOL GROUPS?: NO
HOW OFTEN DO YOU ATTENT MEETINGS OF THE CLUB OR ORGANIZATION YOU BELONG TO?: NEVER
HOW OFTEN DO YOU ATTENT MEETINGS OF THE CLUB OR ORGANIZATION YOU BELONG TO?: NEVER
WITHIN THE PAST 12 MONTHS, YOU WORRIED THAT YOUR FOOD WOULD RUN OUT BEFORE YOU GOT THE MONEY TO BUY MORE: NEVER TRUE
HOW MANY DRINKS CONTAINING ALCOHOL DO YOU HAVE ON A TYPICAL DAY WHEN YOU ARE DRINKING: 1 OR 2
HOW OFTEN DO YOU HAVE SIX OR MORE DRINKS ON ONE OCCASION: MONTHLY
HOW OFTEN DO YOU HAVE A DRINK CONTAINING ALCOHOL: 4 OR MORE TIMES A WEEK
HOW OFTEN DO YOU GET TOGETHER WITH FRIENDS OR RELATIVES?: ONCE A WEEK
DO YOU BELONG TO ANY CLUBS OR ORGANIZATIONS SUCH AS CHURCH GROUPS UNIONS, FRATERNAL OR ATHLETIC GROUPS, OR SCHOOL GROUPS?: NO
HOW OFTEN DO YOU ATTEND CHURCH OR RELIGIOUS SERVICES?: NEVER
HOW HARD IS IT FOR YOU TO PAY FOR THE VERY BASICS LIKE FOOD, HOUSING, MEDICAL CARE, AND HEATING?: NOT HARD AT ALL
HOW OFTEN DO YOU ATTEND CHURCH OR RELIGIOUS SERVICES?: NEVER

## 2022-07-24 ASSESSMENT — LIFESTYLE VARIABLES
HOW OFTEN DO YOU HAVE SIX OR MORE DRINKS ON ONE OCCASION: MONTHLY
SKIP TO QUESTIONS 9-10: 0
AUDIT-C TOTAL SCORE: 6
HOW MANY STANDARD DRINKS CONTAINING ALCOHOL DO YOU HAVE ON A TYPICAL DAY: 1 OR 2
HOW OFTEN DO YOU HAVE A DRINK CONTAINING ALCOHOL: 4 OR MORE TIMES A WEEK

## 2022-07-25 ENCOUNTER — OFFICE VISIT (OUTPATIENT)
Dept: MEDICAL GROUP | Facility: PHYSICIAN GROUP | Age: 47
End: 2022-07-25
Payer: COMMERCIAL

## 2022-07-25 VITALS
BODY MASS INDEX: 35.61 KG/M2 | HEIGHT: 71 IN | SYSTOLIC BLOOD PRESSURE: 106 MMHG | RESPIRATION RATE: 20 BRPM | DIASTOLIC BLOOD PRESSURE: 70 MMHG | TEMPERATURE: 97 F | WEIGHT: 254.4 LBS | HEART RATE: 74 BPM | OXYGEN SATURATION: 96 %

## 2022-07-25 DIAGNOSIS — Z87.39 HISTORY OF GOUT: ICD-10-CM

## 2022-07-25 DIAGNOSIS — K21.9 GASTROESOPHAGEAL REFLUX DISEASE WITHOUT ESOPHAGITIS: ICD-10-CM

## 2022-07-25 DIAGNOSIS — R74.01 ELEVATED ALT MEASUREMENT: ICD-10-CM

## 2022-07-25 DIAGNOSIS — Z87.898 HISTORY OF LYMPHADENOPATHY: ICD-10-CM

## 2022-07-25 DIAGNOSIS — Z86.16 POST-COVID SYNDROME RESOLVED: ICD-10-CM

## 2022-07-25 PROCEDURE — 99214 OFFICE O/P EST MOD 30 MIN: CPT

## 2022-07-25 RX ORDER — ALBUTEROL SULFATE 2.5 MG/3ML
SOLUTION RESPIRATORY (INHALATION)
COMMUNITY

## 2022-07-25 RX ORDER — PREDNISONE 10 MG/1
TABLET ORAL
COMMUNITY
End: 2022-07-25

## 2022-07-25 RX ORDER — ALBUTEROL SULFATE 90 UG/1
AEROSOL, METERED RESPIRATORY (INHALATION)
COMMUNITY

## 2022-07-25 RX ORDER — NAPROXEN 375 MG/1
TABLET ORAL
COMMUNITY
End: 2022-07-25

## 2022-07-25 RX ORDER — CEFDINIR 300 MG/1
CAPSULE ORAL
COMMUNITY
End: 2022-07-25

## 2022-07-25 ASSESSMENT — FIBROSIS 4 INDEX: FIB4 SCORE: 0.77

## 2022-07-25 NOTE — PROGRESS NOTES
"Subjective:     CC:  Diagnoses of Elevated ALT measurement, History of lymphadenopathy, Post-COVID syndrome resolved, History of gout, and Gastroesophageal reflux disease without esophagitis were pertinent to this visit.    HISTORY OF THE PRESENT ILLNESS: Patient is a 47 y.o. male. This pleasant patient is here today to establish care and discuss the following problems:    Problem   Elevated Alt Measurement    This was recorded 5/18/2022.  He has subsequently had a ultrasound showing fibrosis in the liver and splenomegaly.  He has an appointment with GI for fibrosis scan in October.  He also has a lab order for repeat CMP from his GI specialist.  He will have that done between now and his appointment in October. He is not taking any acetaminophen.  He does however drink alcohol-2-3 Beers 3-4 days per week.     History of Lymphadenopathy    Self-limited condition resolved  - This condition started last Tuesday and lasted 4 days resolving on Saturday.  He reports bilateral anterior cervical lymphadenopathy with tenderness.  He denies fever or chills.  He did have some abdominal discomfort.     Post-Covid Syndrome Resolved    May 7th pt had Covid, reports a mild illness with COVID with 2 days of fever and some brain fog.  The brain fog developed 2 weeks into his illness and lasted for a month and a half.  He reports difficulty with simple skills such as using his cell phone to send a text, thought formulation, \"out of body experience\".  He then got vaccinated with the Pfizer-shot (fourth booster) and fortunately his symptoms resolved within a few days.      History of Gout    This is a chronic condition, currently stable  -last flare 8 months ago right great toe- has only had 2 attacks  -Take allopurinol 100 mg daily.  No reported side effects     Gastroesophageal Reflux Disease    Chronic condition, currently stable  -EGD in June was satisfactory, biopsy neg. For Barrets  -He takes omeprazole 20 mg twice daily since " "2018.  Doing well on this medication.         Health Maintenance: Completed    ROS:   Review of Systems   All other systems reviewed and are negative.        Objective:     Exam: /70 (BP Location: Left arm, Patient Position: Sitting, BP Cuff Size: Large adult)   Pulse 74   Temp 36.1 °C (97 °F) (Temporal)   Resp 20   Ht 1.803 m (5' 11\")   Wt 115 kg (254 lb 6.4 oz)   SpO2 96%  Body mass index is 35.48 kg/m².    Physical Exam  Constitutional:       General: He is not in acute distress.     Appearance: Normal appearance. He is normal weight. He is not ill-appearing or toxic-appearing.   HENT:      Head: Normocephalic and atraumatic.      Right Ear: Tympanic membrane normal.      Left Ear: Tympanic membrane normal.      Nose: Nose normal.      Mouth/Throat:      Mouth: Mucous membranes are moist.      Pharynx: Oropharynx is clear. No posterior oropharyngeal erythema.   Eyes:      Extraocular Movements: Extraocular movements intact.      Conjunctiva/sclera: Conjunctivae normal.      Pupils: Pupils are equal, round, and reactive to light.   Cardiovascular:      Rate and Rhythm: Normal rate and regular rhythm.      Pulses: Normal pulses.      Heart sounds: Normal heart sounds. No murmur heard.    No friction rub. No gallop.   Pulmonary:      Effort: Pulmonary effort is normal. No respiratory distress.      Breath sounds: Normal breath sounds.   Musculoskeletal:         General: Normal range of motion.      Cervical back: Normal range of motion and neck supple.   Lymphadenopathy:      Cervical: No cervical adenopathy.   Skin:     General: Skin is warm and dry.      Capillary Refill: Capillary refill takes less than 2 seconds.   Neurological:      General: No focal deficit present.      Mental Status: He is alert and oriented to person, place, and time.   Psychiatric:         Mood and Affect: Mood normal.         Behavior: Behavior normal.           Labs: Labs done 5/18/2022 show CBC within normal limits, CMP with " elevated glucose however patient was not fasting, ALT elevated, glycosylated hemoglobin within normal limits TSH within normal limits    Assessment & Plan: Medical Decision Making   47 y.o. male with the following -    Problem List Items Addressed This Visit     Gastroesophageal reflux disease     Chronic condition currently stable  - Continue to take omeprazole 20 mg twice daily  - Avoid known triggers           Elevated ALT measurement     This is a new condition of uncertain prognosis  - Continue to keep scheduled appointment with GI for further work-up for this condition  - Avoid hepatotoxic substances such as Tylenol and excessive alcohol use  - Recommend dietary/exercise approach to lose weight-low processed food, low sugar, 5-6 fruits and vegetables, exercising at least 30 minutes most days of the week           History of lymphadenopathy     Self-limited condition resolved  - No further work-up needed for this condition  - Notify me if condition returns           Post-COVID syndrome resolved     -Self limited condition resolved  -No workup needed at this time  -Notify me if this condition returns           History of gout     Chronic condition currently stable  - Continue allopurinol 100 mg daily  - Avoid known triggers  -Notify me of gout flares                 Differential diagnosis, natural history, supportive care, and indications for immediate follow-up discussed.  Shared decision making approach was utilized, and patient is amendable with plan of care.  Patient understands to return to clinic or go to the emergency department if symptoms worsen. All questions and concerns addressed.      Return in about 10 months (around 5/25/2023).    Please note that this dictation was created using voice recognition software. I have made every reasonable attempt to correct obvious errors, but I expect that there are errors of grammar and possibly content that I did not discover before finalizing the note.

## 2022-07-25 NOTE — ASSESSMENT & PLAN NOTE
Chronic condition currently stable  - Continue allopurinol 100 mg daily  - Avoid known triggers  -Notify me of gout flares

## 2022-07-25 NOTE — ASSESSMENT & PLAN NOTE
This is a new condition of uncertain prognosis  - Continue to keep scheduled appointment with GI for further work-up for this condition  - Avoid hepatotoxic substances such as Tylenol and excessive alcohol use  - Recommend dietary/exercise approach to lose weight-low processed food, low sugar, 5-6 fruits and vegetables, exercising at least 30 minutes most days of the week

## 2022-07-25 NOTE — ASSESSMENT & PLAN NOTE
Chronic condition currently stable  - Continue to take omeprazole 20 mg twice daily  - Avoid known triggers

## 2022-07-25 NOTE — ASSESSMENT & PLAN NOTE
-Self limited condition resolved  -No workup needed at this time  -Notify me if this condition returns

## 2022-07-25 NOTE — ASSESSMENT & PLAN NOTE
Self-limited condition resolved  - No further work-up needed for this condition  - Notify me if condition returns

## 2022-10-27 ENCOUNTER — HOSPITAL ENCOUNTER (OUTPATIENT)
Dept: LAB | Facility: MEDICAL CENTER | Age: 47
End: 2022-10-27
Payer: COMMERCIAL

## 2022-10-27 LAB
ALBUMIN SERPL BCP-MCNC: 4.9 G/DL (ref 3.2–4.9)
ALBUMIN/GLOB SERPL: 1.9 G/DL
ALP SERPL-CCNC: 66 U/L (ref 30–99)
ALT SERPL-CCNC: 133 U/L (ref 2–50)
ANION GAP SERPL CALC-SCNC: 15 MMOL/L (ref 7–16)
AST SERPL-CCNC: 78 U/L (ref 12–45)
BASOPHILS # BLD AUTO: 0.9 % (ref 0–1.8)
BASOPHILS # BLD: 0.05 K/UL (ref 0–0.12)
BILIRUB SERPL-MCNC: 0.7 MG/DL (ref 0.1–1.5)
BUN SERPL-MCNC: 16 MG/DL (ref 8–22)
CALCIUM SERPL-MCNC: 9.7 MG/DL (ref 8.5–10.5)
CHLORIDE SERPL-SCNC: 96 MMOL/L (ref 96–112)
CO2 SERPL-SCNC: 21 MMOL/L (ref 20–33)
CREAT SERPL-MCNC: 0.93 MG/DL (ref 0.5–1.4)
EOSINOPHIL # BLD AUTO: 0.22 K/UL (ref 0–0.51)
EOSINOPHIL NFR BLD: 3.8 % (ref 0–6.9)
ERYTHROCYTE [DISTWIDTH] IN BLOOD BY AUTOMATED COUNT: 41.1 FL (ref 35.9–50)
FERRITIN SERPL-MCNC: 591 NG/ML (ref 22–322)
GFR SERPLBLD CREATININE-BSD FMLA CKD-EPI: 103 ML/MIN/1.73 M 2
GLOBULIN SER CALC-MCNC: 2.6 G/DL (ref 1.9–3.5)
GLUCOSE SERPL-MCNC: 106 MG/DL (ref 65–99)
HBV CORE AB SERPL QL IA: NONREACTIVE
HBV SURFACE AB SERPL IA-ACNC: 6274 MIU/ML (ref 0–10)
HBV SURFACE AG SER QL: NORMAL
HCT VFR BLD AUTO: 46.1 % (ref 42–52)
HCV AB SER QL: NORMAL
HGB BLD-MCNC: 16.5 G/DL (ref 14–18)
IMM GRANULOCYTES # BLD AUTO: 0.02 K/UL (ref 0–0.11)
IMM GRANULOCYTES NFR BLD AUTO: 0.3 % (ref 0–0.9)
INR PPP: 0.97 (ref 0.87–1.13)
IRON SATN MFR SERPL: 48 % (ref 15–55)
IRON SERPL-MCNC: 161 UG/DL (ref 50–180)
LYMPHOCYTES # BLD AUTO: 1.86 K/UL (ref 1–4.8)
LYMPHOCYTES NFR BLD: 31.8 % (ref 22–41)
MCH RBC QN AUTO: 32 PG (ref 27–33)
MCHC RBC AUTO-ENTMCNC: 35.8 G/DL (ref 33.7–35.3)
MCV RBC AUTO: 89.3 FL (ref 81.4–97.8)
MONOCYTES # BLD AUTO: 0.42 K/UL (ref 0–0.85)
MONOCYTES NFR BLD AUTO: 7.2 % (ref 0–13.4)
NEUTROPHILS # BLD AUTO: 3.27 K/UL (ref 1.82–7.42)
NEUTROPHILS NFR BLD: 56 % (ref 44–72)
NRBC # BLD AUTO: 0 K/UL
NRBC BLD-RTO: 0 /100 WBC
PLATELET # BLD AUTO: 197 K/UL (ref 164–446)
PMV BLD AUTO: 9.3 FL (ref 9–12.9)
POTASSIUM SERPL-SCNC: 4.1 MMOL/L (ref 3.6–5.5)
PROT SERPL-MCNC: 7.5 G/DL (ref 6–8.2)
PROTHROMBIN TIME: 12.8 SEC (ref 12–14.6)
RBC # BLD AUTO: 5.16 M/UL (ref 4.7–6.1)
SODIUM SERPL-SCNC: 132 MMOL/L (ref 135–145)
TIBC SERPL-MCNC: 336 UG/DL (ref 250–450)
TSH SERPL DL<=0.005 MIU/L-ACNC: 3.3 UIU/ML (ref 0.38–5.33)
UIBC SERPL-MCNC: 175 UG/DL (ref 110–370)
WBC # BLD AUTO: 5.8 K/UL (ref 4.8–10.8)

## 2022-10-27 PROCEDURE — 85610 PROTHROMBIN TIME: CPT

## 2022-10-27 PROCEDURE — 82465 ASSAY BLD/SERUM CHOLESTEROL: CPT

## 2022-10-27 PROCEDURE — 82172 ASSAY OF APOLIPOPROTEIN: CPT

## 2022-10-27 PROCEDURE — 83550 IRON BINDING TEST: CPT

## 2022-10-27 PROCEDURE — 86704 HEP B CORE ANTIBODY TOTAL: CPT

## 2022-10-27 PROCEDURE — 82947 ASSAY GLUCOSE BLOOD QUANT: CPT

## 2022-10-27 PROCEDURE — 84443 ASSAY THYROID STIM HORMONE: CPT

## 2022-10-27 PROCEDURE — 86803 HEPATITIS C AB TEST: CPT

## 2022-10-27 PROCEDURE — 85025 COMPLETE CBC W/AUTO DIFF WBC: CPT

## 2022-10-27 PROCEDURE — 86015 ACTIN ANTIBODY EACH: CPT

## 2022-10-27 PROCEDURE — 80053 COMPREHEN METABOLIC PANEL: CPT

## 2022-10-27 PROCEDURE — 84478 ASSAY OF TRIGLYCERIDES: CPT

## 2022-10-27 PROCEDURE — 82104 ALPHA-1-ANTITRYPSIN PHENO: CPT

## 2022-10-27 PROCEDURE — 83883 ASSAY NEPHELOMETRY NOT SPEC: CPT

## 2022-10-27 PROCEDURE — 83010 ASSAY OF HAPTOGLOBIN QUANT: CPT

## 2022-10-27 PROCEDURE — 86381 MITOCHONDRIAL ANTIBODY EACH: CPT

## 2022-10-27 PROCEDURE — 84450 TRANSFERASE (AST) (SGOT): CPT

## 2022-10-27 PROCEDURE — 82103 ALPHA-1-ANTITRYPSIN TOTAL: CPT

## 2022-10-27 PROCEDURE — 82390 ASSAY OF CERULOPLASMIN: CPT

## 2022-10-27 PROCEDURE — 82728 ASSAY OF FERRITIN: CPT

## 2022-10-27 PROCEDURE — 86038 ANTINUCLEAR ANTIBODIES: CPT

## 2022-10-27 PROCEDURE — 36415 COLL VENOUS BLD VENIPUNCTURE: CPT

## 2022-10-27 PROCEDURE — 87340 HEPATITIS B SURFACE AG IA: CPT

## 2022-10-27 PROCEDURE — 86708 HEPATITIS A ANTIBODY: CPT

## 2022-10-27 PROCEDURE — 84460 ALANINE AMINO (ALT) (SGPT): CPT

## 2022-10-27 PROCEDURE — 83540 ASSAY OF IRON: CPT

## 2022-10-27 PROCEDURE — 82977 ASSAY OF GGT: CPT

## 2022-10-27 PROCEDURE — 86706 HEP B SURFACE ANTIBODY: CPT

## 2022-10-27 PROCEDURE — 82247 BILIRUBIN TOTAL: CPT

## 2022-10-28 LAB
CERULOPLASMIN SERPL-MCNC: 24 MG/DL (ref 15–30)
HAV AB SER QL IA: POSITIVE
MITOCHONDRIA M2 IGG SER-ACNC: 3.5 UNITS (ref 0–24.9)
NUCLEAR IGG SER QL IA: NORMAL
SMA IGG SER-ACNC: 3 UNITS (ref 0–19)

## 2022-10-31 LAB
A1AT PHENOTYP SERPL-IMP: NORMAL
A1AT SERPL-MCNC: 125 MG/DL (ref 90–200)
A2 MACROGLOB SERPL-MCNC: 134 MG/DL (ref 110–276)
ALT SERPL W P-5'-P-CCNC: 143 IU/L (ref 0–55)
APO A-I SERPL-MCNC: 136 MG/DL (ref 101–178)
AST SERPL W P-5'-P-CCNC: 87 IU/L (ref 0–40)
BILIRUB SERPL-MCNC: 0.6 MG/DL (ref 0–1.2)
CHOLEST SERPL-MCNC: 278 MG/DL (ref 100–199)
FIBROSIS SCORING 550107: ABNORMAL
FIBROSIS STAGE SERPL QL: ABNORMAL
GGT SERPL-CCNC: 128 IU/L (ref 0–65)
GLUCOSE SERPL-MCNC: 107 MG/DL (ref 70–99)
HAPTOGLOB SERPL-MCNC: 166 MG/DL (ref 23–355)
INTERPRETATIONS: Z4787: ABNORMAL
LABORATORY COMMENT REPORT: ABNORMAL
LIVER FIBR SCORE SERPL CALC.FIBROSURE: 0.2 (ref 0–0.21)
NASH SCORING Z4789: ABNORMAL
NECROINFLAMMATORY ACT GRADE SERPL QL: ABNORMAL
NECROINFLAMMATORY ACT SCORE SERPL: 0.5
SERVICE CMNT-IMP: ABNORMAL
STEATOSIS GRADE Z4778: ABNORMAL
STEATOSIS GRADING Z4788: ABNORMAL
STEATOSIS SCORE Z4777: 0.92 (ref 0–0.3)
TRIGL SERPL-MCNC: 447 MG/DL (ref 0–149)

## 2022-12-12 DIAGNOSIS — Z87.39 HISTORY OF GOUT: ICD-10-CM

## 2022-12-12 DIAGNOSIS — M10.9 ACUTE GOUT OF FOOT, UNSPECIFIED CAUSE, UNSPECIFIED LATERALITY: ICD-10-CM

## 2022-12-12 RX ORDER — METHYLPREDNISOLONE 4 MG/1
TABLET ORAL
Qty: 21 TABLET | Refills: 0 | Status: SHIPPED
Start: 2022-12-12 | End: 2023-11-08

## 2022-12-27 DIAGNOSIS — E79.0 ELEVATED URIC ACID IN BLOOD: ICD-10-CM

## 2022-12-27 RX ORDER — ALLOPURINOL 100 MG/1
100 TABLET ORAL
Qty: 90 TABLET | Refills: 0 | Status: SHIPPED | OUTPATIENT
Start: 2022-12-27 | End: 2023-03-27 | Stop reason: SDUPTHER

## 2022-12-27 NOTE — TELEPHONE ENCOUNTER
Received request via: Pharmacy    Was the patient seen in the last year in this department? Yes    Does the patient have an active prescription (recently filled or refills available) for medication(s) requested? No    Does the patient have detention Plus and need 100 day supply (blood pressure, diabetes and cholesterol meds only)? Patient does not have SCP

## 2023-03-27 DIAGNOSIS — E79.0 ELEVATED URIC ACID IN BLOOD: ICD-10-CM

## 2023-03-28 RX ORDER — ALLOPURINOL 100 MG/1
100 TABLET ORAL
Qty: 90 TABLET | Refills: 0 | Status: SHIPPED | OUTPATIENT
Start: 2023-03-28 | End: 2023-06-18 | Stop reason: SDUPTHER

## 2023-06-18 DIAGNOSIS — E79.0 ELEVATED URIC ACID IN BLOOD: ICD-10-CM

## 2023-06-19 RX ORDER — ALLOPURINOL 100 MG/1
100 TABLET ORAL
Qty: 90 TABLET | Refills: 0 | Status: SHIPPED | OUTPATIENT
Start: 2023-06-19 | End: 2023-09-26 | Stop reason: SDUPTHER

## 2023-09-05 DIAGNOSIS — I10 PRIMARY HYPERTENSION: ICD-10-CM

## 2023-09-05 RX ORDER — LISINOPRIL 10 MG/1
10 TABLET ORAL
Qty: 30 TABLET | Refills: 0 | OUTPATIENT
Start: 2023-09-05

## 2023-09-26 DIAGNOSIS — I10 PRIMARY HYPERTENSION: ICD-10-CM

## 2023-09-26 RX ORDER — LISINOPRIL 10 MG/1
10 TABLET ORAL
Qty: 30 TABLET | Refills: 0 | Status: SHIPPED | OUTPATIENT
Start: 2023-09-26 | End: 2023-10-30 | Stop reason: SDUPTHER

## 2023-10-30 DIAGNOSIS — I10 PRIMARY HYPERTENSION: ICD-10-CM

## 2023-10-30 NOTE — TELEPHONE ENCOUNTER
Received request via: Pharmacy    Was the patient seen in the last year in this department? No    Does the patient have an active prescription (recently filled or refills available) for medication(s) requested? No    Does the patient have detention Plus and need 100 day supply (blood pressure, diabetes and cholesterol meds only)? Patient does not have SCP

## 2023-10-31 DIAGNOSIS — I10 PRIMARY HYPERTENSION: ICD-10-CM

## 2023-10-31 DIAGNOSIS — E79.0 ELEVATED URIC ACID IN BLOOD: ICD-10-CM

## 2023-10-31 RX ORDER — LISINOPRIL 10 MG/1
10 TABLET ORAL
Qty: 15 TABLET | Refills: 0 | Status: SHIPPED | OUTPATIENT
Start: 2023-10-31 | End: 2023-10-31 | Stop reason: SDUPTHER

## 2023-10-31 RX ORDER — LISINOPRIL 10 MG/1
10 TABLET ORAL
Qty: 15 TABLET | Refills: 0 | Status: SHIPPED | OUTPATIENT
Start: 2023-10-31 | End: 2023-11-08 | Stop reason: SDUPTHER

## 2023-10-31 RX ORDER — ALLOPURINOL 100 MG/1
100 TABLET ORAL
Qty: 15 TABLET | Refills: 0 | Status: SHIPPED | OUTPATIENT
Start: 2023-10-31 | End: 2023-11-08 | Stop reason: SDUPTHER

## 2023-11-08 ENCOUNTER — OFFICE VISIT (OUTPATIENT)
Dept: MEDICAL GROUP | Facility: PHYSICIAN GROUP | Age: 48
End: 2023-11-08
Payer: COMMERCIAL

## 2023-11-08 VITALS
TEMPERATURE: 97.5 F | HEIGHT: 71 IN | SYSTOLIC BLOOD PRESSURE: 120 MMHG | OXYGEN SATURATION: 97 % | WEIGHT: 246.4 LBS | HEART RATE: 69 BPM | BODY MASS INDEX: 34.5 KG/M2 | DIASTOLIC BLOOD PRESSURE: 70 MMHG

## 2023-11-08 DIAGNOSIS — E79.0 ELEVATED URIC ACID IN BLOOD: ICD-10-CM

## 2023-11-08 DIAGNOSIS — Z23 NEED FOR VACCINATION: ICD-10-CM

## 2023-11-08 DIAGNOSIS — E78.2 MIXED HYPERLIPIDEMIA: ICD-10-CM

## 2023-11-08 DIAGNOSIS — M1A.0710 CHRONIC IDIOPATHIC GOUT INVOLVING TOE OF RIGHT FOOT WITHOUT TOPHUS: ICD-10-CM

## 2023-11-08 DIAGNOSIS — I10 PRIMARY HYPERTENSION: ICD-10-CM

## 2023-11-08 DIAGNOSIS — K76.0 HEPATIC STEATOSIS: ICD-10-CM

## 2023-11-08 DIAGNOSIS — R73.01 IFG (IMPAIRED FASTING GLUCOSE): ICD-10-CM

## 2023-11-08 PROCEDURE — 90686 IIV4 VACC NO PRSV 0.5 ML IM: CPT

## 2023-11-08 PROCEDURE — 3078F DIAST BP <80 MM HG: CPT

## 2023-11-08 PROCEDURE — 99214 OFFICE O/P EST MOD 30 MIN: CPT | Mod: 25

## 2023-11-08 PROCEDURE — 90471 IMMUNIZATION ADMIN: CPT

## 2023-11-08 PROCEDURE — 3074F SYST BP LT 130 MM HG: CPT

## 2023-11-08 RX ORDER — ALLOPURINOL 100 MG/1
100 TABLET ORAL
Qty: 15 TABLET | Refills: 0 | Status: SHIPPED | OUTPATIENT
Start: 2023-11-08 | End: 2023-11-29 | Stop reason: SDUPTHER

## 2023-11-08 RX ORDER — LISINOPRIL 10 MG/1
10 TABLET ORAL
Qty: 15 TABLET | Refills: 0 | Status: SHIPPED | OUTPATIENT
Start: 2023-11-08 | End: 2023-11-08 | Stop reason: SDUPTHER

## 2023-11-08 RX ORDER — LISINOPRIL 10 MG/1
10 TABLET ORAL
Qty: 90 TABLET | Refills: 3 | Status: SHIPPED | OUTPATIENT
Start: 2023-11-08

## 2023-11-08 ASSESSMENT — ENCOUNTER SYMPTOMS
WEAKNESS: 0
CHILLS: 0
CONSTIPATION: 0
SHORTNESS OF BREATH: 0
DIARRHEA: 0
VOMITING: 0
NAUSEA: 0
FEVER: 0
ABDOMINAL PAIN: 0
DIZZINESS: 0
COUGH: 0
HEADACHES: 0
WEIGHT LOSS: 0
BLURRED VISION: 0
MYALGIAS: 0

## 2023-11-08 ASSESSMENT — PATIENT HEALTH QUESTIONNAIRE - PHQ9: CLINICAL INTERPRETATION OF PHQ2 SCORE: 0

## 2023-11-08 ASSESSMENT — FIBROSIS 4 INDEX: FIB4 SCORE: 1.7

## 2023-11-08 NOTE — PROGRESS NOTES
"Subjective:     CC: follow up    HPI:   Jae presents today with    Problem   Chronic Idiopathic Gout Involving Toe of Right Foot Without Tophus    Chronic condition, currently stable  Takes allopurinol 100 mg daily  Most recent flare January 2023, took medrol dose pack and successfully subsided     Hepatic Steatosis    Has been seen by GI in the past, no current issues  Elevated LFTs in the past, no recent levels  Does not use acetaminophen but does drink alcohol and has elevated BMI 34.37     Mixed Hyperlipidemia    Chronic condition  Not taking a statin  Lab Results   Component Value Date/Time    CHOLSTRLTOT 278 (H) 10/27/2022 09:08 AM    CHOLSTRLTOT 260 (H) 12/01/2021 09:10 AM     (H) 12/01/2021 09:10 AM    HDL 35 (A) 12/01/2021 09:10 AM    TRIGLYCERIDE 447 (H) 10/27/2022 09:08 AM    TRIGLYCERIDE 345 (H) 12/01/2021 09:10 AM          Primary Hypertension    Chronic  BP today is 120/70  Needs refill on lisinopril           ROS:  Review of Systems   Constitutional:  Negative for chills, fever, malaise/fatigue and weight loss.   Eyes:  Negative for blurred vision.   Respiratory:  Negative for cough and shortness of breath.    Cardiovascular:  Negative for chest pain.   Gastrointestinal:  Negative for abdominal pain, constipation, diarrhea, nausea and vomiting.   Musculoskeletal:  Negative for myalgias.   Neurological:  Negative for dizziness, weakness and headaches.       Objective:     Exam:  /70 (BP Location: Left arm, Patient Position: Sitting, BP Cuff Size: Large adult)   Pulse 69   Temp 36.4 °C (97.5 °F) (Temporal)   Ht 1.803 m (5' 11\")   Wt 112 kg (246 lb 6.4 oz)   SpO2 97%   BMI 34.37 kg/m²  Body mass index is 34.37 kg/m².    Physical Exam  Constitutional:       Appearance: Normal appearance.   HENT:      Head: Normocephalic.   Eyes:      Conjunctiva/sclera: Conjunctivae normal.      Pupils: Pupils are equal, round, and reactive to light.   Pulmonary:      Effort: Pulmonary effort is " normal. No respiratory distress.   Musculoskeletal:         General: Normal range of motion.   Skin:     General: Skin is warm and dry.   Neurological:      General: No focal deficit present.      Mental Status: He is alert and oriented to person, place, and time.   Psychiatric:         Mood and Affect: Mood normal.         Behavior: Behavior normal.         Labs:   No visits with results within 1 Month(s) from this visit.   Latest known visit with results is:   Hospital Outpatient Visit on 10/27/2022   Component Date Value    Hepatitis B Surface Anti* 10/27/2022 Non-Reactive     Hep B Surface Antibody Q* 10/27/2022 6274.00 (H)     Hepatitis B Core Ab, Tot* 10/27/2022 NonReactive     Hepatitis C Antibody 10/27/2022 Non-Reactive     Iron 10/27/2022 161     Total Iron Binding 10/27/2022 336     Unsat Iron Binding 10/27/2022 175     % Saturation 10/27/2022 48     Ferritin 10/27/2022 591.0 (H)     Antinuclear Antibody 10/27/2022 None Detected     F-Actin Ab, IgG 10/27/2022 3     Ceruloplasmin 10/27/2022 24     TSH 10/27/2022 3.300     WBC 10/27/2022 5.8     RBC 10/27/2022 5.16     Hemoglobin 10/27/2022 16.5     Hematocrit 10/27/2022 46.1     MCV 10/27/2022 89.3     MCH 10/27/2022 32.0     MCHC 10/27/2022 35.8 (H)     RDW 10/27/2022 41.1     Platelet Count 10/27/2022 197     MPV 10/27/2022 9.3     Neutrophils-Polys 10/27/2022 56.00     Lymphocytes 10/27/2022 31.80     Monocytes 10/27/2022 7.20     Eosinophils 10/27/2022 3.80     Basophils 10/27/2022 0.90     Immature Granulocytes 10/27/2022 0.30     Nucleated RBC 10/27/2022 0.00     Neutrophils (Absolute) 10/27/2022 3.27     Lymphs (Absolute) 10/27/2022 1.86     Monos (Absolute) 10/27/2022 0.42     Eos (Absolute) 10/27/2022 0.22     Baso (Absolute) 10/27/2022 0.05     Immature Granulocytes (a* 10/27/2022 0.02     NRBC (Absolute) 10/27/2022 0.00     PT 10/27/2022 12.8     INR 10/27/2022 0.97     Hep A Virus Antibody Tot* 10/27/2022 Positive (A)     Alpha-1-Antitrypsin  10/27/2022 125     Phenotype 10/27/2022 M1M1     Anti-Mitochondrial Ab 10/27/2022 3.5     Sodium 10/27/2022 132 (L)     Potassium 10/27/2022 4.1     Chloride 10/27/2022 96     Co2 10/27/2022 21     Anion Gap 10/27/2022 15.0     Glucose 10/27/2022 106 (H)     Bun 10/27/2022 16     Creatinine 10/27/2022 0.93     Calcium 10/27/2022 9.7     AST(SGOT) 10/27/2022 78 (H)     ALT(SGPT) 10/27/2022 133 (H)     Alkaline Phosphatase 10/27/2022 66     Total Bilirubin 10/27/2022 0.7     Albumin 10/27/2022 4.9     Total Protein 10/27/2022 7.5     Globulin 10/27/2022 2.6     A-G Ratio 10/27/2022 1.9     Fibrosis Score 10/27/2022 0.20     Fibrosis Stage 10/27/2022 SEE BELOW     Steatosis Score 10/27/2022 0.92 (H)     Steatosis Grade 10/27/2022 SEE BELOW     Necroinflam. Act. Score 10/27/2022 0.50 (H)     Necroinflam. Act. Grade 10/27/2022 SEE BELOW     Height: 10/27/2022 71     Weight: 10/27/2022 235     Alpha-2 Macroglobulin 10/27/2022 134     Haptoglobin 10/27/2022 166     Apolipoprotein A1 10/27/2022 136     Total Bilirubin 10/27/2022 0.6     Gamma Gt 10/27/2022 128 (H)     ALT (SGPT) 10/27/2022 143 (H)     AST (SGOT) 10/27/2022 87 (H)     Cholesterol,Tot 10/27/2022 278 (H)     Glucose, FibroMeter 10/27/2022 107 (H)     Triglycerides 10/27/2022 447 (H)     Interpretations: 10/27/2022 SEE BELOW     Fibrosis Scoring 10/27/2022 SEE BELOW     Steatosis Grading 10/27/2022 SEE BELOW     BROOKS Scoring 10/27/2022 SEE BELOW     Limitations: 10/27/2022 SEE BELOW     Comment: 10/27/2022 SEE BELOW     GFR (CKD-EPI) 10/27/2022 103          Assessment & Plan: Medical Decision Making     48 y.o. male with the following -     Problem List Items Addressed This Visit       Primary hypertension     Chronic, controlled.  Blood pressure is 120/70 in the office today.   Medications: lisinopril 10 mg daily, no adverse side effects noted.  We will continue the current regimen.  Recommend home blood pressure monitoring:  - check your blood pressure every  day and put it in a log  - pick a different time each day so we can see how it varies throughout the day  - when you check your blood pressure: make sure you sit quietly for 5-10 minutes beforehand, keep both feet flat on the ground, and make sure you use an arm cuff at heart height    Lifestyle factors to help manage blood pressure:  1) reduce stress with daily activity, consider yoga, or meditation  2) reduce Na to <2000 mg/day-avoid putting extra salt on food, avoid packaged/processed foods, avoid excessive sugar intake  3) Mediterranean diet   4) 30 minutes of cardio and resistance training most days of the week, which you can build up to at least 150 min./week               Relevant Medications    lisinopril (PRINIVIL) 10 MG Tab    Mixed hyperlipidemia     Chronic uncontrolled  The 10-year ASCVD risk score (Casey PANIAGUA, et al., 2019) is: 6.2%  -Exercise: At least 150 minutes of moderate aerobic activity per week or 75 minutes of vigorous aerobic activity per week, +2 days/week of strength training  - Healthy lifestyle and eating habits: Mediterranean-based diet (rich in fruits, vegetables, nuts and healthy oils), proper hydration and avoiding sugary beverages, adequate sleep hygiene-(allowing 7 to 8 hours of overnight sleep).             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     Chronic stable  Refill for allopurinol 100 mg daily         Relevant Medications    allopurinol (ZYLOPRIM) 100 MG Tab    Other Relevant Orders    Comp Metabolic Panel    Hepatic steatosis     Chronic, unknown progression  Recommend healthy lifestyle as discussed, including decrease in alcohol intake, weight loss, cutting back on carbs/sugar            Other Visit Diagnoses       Need for vaccination        Relevant Orders    INFLUENZA VACCINE QUAD INJ (PF)  (Completed)    Elevated uric acid in blood        Relevant Medications    allopurinol (ZYLOPRIM) 100 MG Tab    IFG (impaired fasting glucose)        Relevant Orders    HEMOGLOBIN A1C            Differential diagnosis, natural history, supportive care, and indications for immediate follow-up discussed.  Shared decision making approach utilized, and patient is amendable with plan of care.  Patient understands to return to clinic or go to the emergency department if symptoms worsen. All questions and concerns addressed to the best of my knowledge.    Return in about 6 months (around 5/8/2024).    Please note that this dictation was created using voice recognition software. I have made every reasonable attempt to correct obvious errors, but I expect that there are errors of grammar and possibly content that I did not discover before finalizing the note.

## 2023-11-08 NOTE — ASSESSMENT & PLAN NOTE
Chronic, controlled.  Blood pressure is 120/70 in the office today.   Medications: lisinopril 10 mg daily, no adverse side effects noted.  We will continue the current regimen.  Recommend home blood pressure monitoring:  - check your blood pressure every day and put it in a log  - pick a different time each day so we can see how it varies throughout the day  - when you check your blood pressure: make sure you sit quietly for 5-10 minutes beforehand, keep both feet flat on the ground, and make sure you use an arm cuff at heart height    Lifestyle factors to help manage blood pressure:  1) reduce stress with daily activity, consider yoga, or meditation  2) reduce Na to <2000 mg/day-avoid putting extra salt on food, avoid packaged/processed foods, avoid excessive sugar intake  3) Mediterranean diet   4) 30 minutes of cardio and resistance training most days of the week, which you can build up to at least 150 min./week

## 2023-11-08 NOTE — ASSESSMENT & PLAN NOTE
Chronic, unknown progression  Recommend healthy lifestyle as discussed, including decrease in alcohol intake, weight loss, cutting back on carbs/sugar

## 2023-11-08 NOTE — ASSESSMENT & PLAN NOTE
Chronic uncontrolled  The 10-year ASCVD risk score (Casey PANIAGUA, et al., 2019) is: 6.2%  -Exercise: At least 150 minutes of moderate aerobic activity per week or 75 minutes of vigorous aerobic activity per week, +2 days/week of strength training  - Healthy lifestyle and eating habits: Mediterranean-based diet (rich in fruits, vegetables, nuts and healthy oils), proper hydration and avoiding sugary beverages, adequate sleep hygiene-(allowing 7 to 8 hours of overnight sleep).

## 2023-11-15 ENCOUNTER — HOSPITAL ENCOUNTER (OUTPATIENT)
Dept: RADIOLOGY | Facility: MEDICAL CENTER | Age: 48
End: 2023-11-15
Payer: COMMERCIAL

## 2023-11-15 DIAGNOSIS — E78.2 MIXED HYPERLIPIDEMIA: ICD-10-CM

## 2023-11-15 PROCEDURE — 4410556 CT-CARDIAC SCORING (SELF PAY ONLY)

## 2023-11-29 ENCOUNTER — PATIENT MESSAGE (OUTPATIENT)
Dept: MEDICAL GROUP | Facility: PHYSICIAN GROUP | Age: 48
End: 2023-11-29
Payer: COMMERCIAL

## 2023-11-29 DIAGNOSIS — E79.0 ELEVATED URIC ACID IN BLOOD: ICD-10-CM

## 2023-11-29 RX ORDER — ALLOPURINOL 100 MG/1
100 TABLET ORAL
Qty: 90 TABLET | Refills: 3 | Status: SHIPPED | OUTPATIENT
Start: 2023-11-29 | End: 2023-11-29 | Stop reason: SDUPTHER

## 2023-11-29 RX ORDER — ALLOPURINOL 100 MG/1
100 TABLET ORAL
Qty: 90 TABLET | Refills: 3 | Status: SHIPPED | OUTPATIENT
Start: 2023-11-29

## 2023-12-21 ENCOUNTER — HOSPITAL ENCOUNTER (OUTPATIENT)
Dept: LAB | Facility: MEDICAL CENTER | Age: 48
End: 2023-12-21
Payer: COMMERCIAL

## 2023-12-21 DIAGNOSIS — R73.01 IFG (IMPAIRED FASTING GLUCOSE): ICD-10-CM

## 2023-12-21 DIAGNOSIS — E78.2 MIXED HYPERLIPIDEMIA: ICD-10-CM

## 2023-12-21 DIAGNOSIS — M1A.0710 CHRONIC IDIOPATHIC GOUT INVOLVING TOE OF RIGHT FOOT WITHOUT TOPHUS: ICD-10-CM

## 2023-12-21 LAB
ALBUMIN SERPL BCP-MCNC: 4.6 G/DL (ref 3.2–4.9)
ALBUMIN/GLOB SERPL: 1.6 G/DL
ALP SERPL-CCNC: 60 U/L (ref 30–99)
ALT SERPL-CCNC: 199 U/L (ref 2–50)
ANION GAP SERPL CALC-SCNC: 14 MMOL/L (ref 7–16)
AST SERPL-CCNC: 159 U/L (ref 12–45)
BILIRUB SERPL-MCNC: 0.7 MG/DL (ref 0.1–1.5)
BUN SERPL-MCNC: 12 MG/DL (ref 8–22)
CALCIUM ALBUM COR SERPL-MCNC: 8.9 MG/DL (ref 8.5–10.5)
CALCIUM SERPL-MCNC: 9.4 MG/DL (ref 8.5–10.5)
CHLORIDE SERPL-SCNC: 100 MMOL/L (ref 96–112)
CHOLEST SERPL-MCNC: 276 MG/DL (ref 100–199)
CO2 SERPL-SCNC: 22 MMOL/L (ref 20–33)
CREAT SERPL-MCNC: 0.86 MG/DL (ref 0.5–1.4)
EST. AVERAGE GLUCOSE BLD GHB EST-MCNC: 103 MG/DL
FASTING STATUS PATIENT QL REPORTED: NORMAL
GFR SERPLBLD CREATININE-BSD FMLA CKD-EPI: 108 ML/MIN/1.73 M 2
GLOBULIN SER CALC-MCNC: 2.9 G/DL (ref 1.9–3.5)
GLUCOSE SERPL-MCNC: 100 MG/DL (ref 65–99)
HBA1C MFR BLD: 5.2 % (ref 4–5.6)
HDLC SERPL-MCNC: 37 MG/DL
LDLC SERPL CALC-MCNC: 183 MG/DL
POTASSIUM SERPL-SCNC: 4.3 MMOL/L (ref 3.6–5.5)
PROT SERPL-MCNC: 7.5 G/DL (ref 6–8.2)
SODIUM SERPL-SCNC: 136 MMOL/L (ref 135–145)
TRIGL SERPL-MCNC: 281 MG/DL (ref 0–149)

## 2023-12-21 PROCEDURE — 83036 HEMOGLOBIN GLYCOSYLATED A1C: CPT

## 2023-12-21 PROCEDURE — 36415 COLL VENOUS BLD VENIPUNCTURE: CPT

## 2023-12-21 PROCEDURE — 80061 LIPID PANEL: CPT

## 2023-12-21 PROCEDURE — 80053 COMPREHEN METABOLIC PANEL: CPT

## 2024-11-05 DIAGNOSIS — E79.0 ELEVATED URIC ACID IN BLOOD: ICD-10-CM

## 2024-11-05 DIAGNOSIS — I10 PRIMARY HYPERTENSION: ICD-10-CM

## 2024-11-06 RX ORDER — ALLOPURINOL 100 MG/1
100 TABLET ORAL
Qty: 30 TABLET | Refills: 0 | Status: SHIPPED | OUTPATIENT
Start: 2024-11-06

## 2024-11-06 RX ORDER — LISINOPRIL 10 MG/1
10 TABLET ORAL
Qty: 30 TABLET | Refills: 0 | Status: SHIPPED | OUTPATIENT
Start: 2024-11-06

## 2024-11-06 NOTE — TELEPHONE ENCOUNTER
Received request via: Pharmacy    Was the patient seen in the last year in this department? Yes    Does the patient have an active prescription (recently filled or refills available) for medication(s) requested? No    [facility]    Does the patient have senior living Plus and need 100-day supply? (This applies to ALL medications) Patient does not have SCP

## 2024-12-13 DIAGNOSIS — I10 PRIMARY HYPERTENSION: ICD-10-CM

## 2024-12-13 NOTE — TELEPHONE ENCOUNTER
Received request via: Patient    Was the patient seen in the last year in this department? Yes    Does the patient have an active prescription (recently filled or refills available) for medication(s) requested? No    Pharmacy Name:     Does the patient have longterm Plus and need 100-day supply? (This applies to ALL medications) Patient does not have SCP

## 2024-12-16 RX ORDER — LISINOPRIL 10 MG/1
10 TABLET ORAL
Qty: 15 TABLET | Refills: 0 | Status: SHIPPED | OUTPATIENT
Start: 2024-12-16

## 2025-01-06 DIAGNOSIS — I10 PRIMARY HYPERTENSION: ICD-10-CM

## 2025-01-06 DIAGNOSIS — E79.0 ELEVATED URIC ACID IN BLOOD: ICD-10-CM

## 2025-01-06 RX ORDER — LISINOPRIL 10 MG/1
10 TABLET ORAL
Qty: 60 TABLET | Refills: 0 | Status: SHIPPED | OUTPATIENT
Start: 2025-01-06

## 2025-01-06 RX ORDER — ALLOPURINOL 100 MG/1
100 TABLET ORAL
Qty: 60 TABLET | Refills: 0 | Status: SHIPPED | OUTPATIENT
Start: 2025-01-06

## 2025-01-06 NOTE — TELEPHONE ENCOUNTER
Received request via: Pharmacy    Was the patient seen in the last year in this department? Yes    Does the patient have an active prescription (recently filled or refills available) for medication(s) requested? No    [facility]     Does the patient have retirement Plus and need 100-day supply? (This applies to ALL medications) Patient does not have SCP

## 2025-02-25 ENCOUNTER — APPOINTMENT (OUTPATIENT)
Dept: MEDICAL GROUP | Facility: PHYSICIAN GROUP | Age: 50
End: 2025-02-25
Payer: COMMERCIAL

## 2025-02-25 VITALS
OXYGEN SATURATION: 96 % | DIASTOLIC BLOOD PRESSURE: 80 MMHG | WEIGHT: 256.6 LBS | BODY MASS INDEX: 35.92 KG/M2 | TEMPERATURE: 97.2 F | HEART RATE: 89 BPM | HEIGHT: 71 IN | SYSTOLIC BLOOD PRESSURE: 135 MMHG

## 2025-02-25 DIAGNOSIS — Z12.11 SCREENING FOR COLORECTAL CANCER: ICD-10-CM

## 2025-02-25 DIAGNOSIS — M1A.0710 CHRONIC IDIOPATHIC GOUT INVOLVING TOE OF RIGHT FOOT WITHOUT TOPHUS: ICD-10-CM

## 2025-02-25 DIAGNOSIS — I10 PRIMARY HYPERTENSION: ICD-10-CM

## 2025-02-25 DIAGNOSIS — E79.0 ELEVATED URIC ACID IN BLOOD: ICD-10-CM

## 2025-02-25 DIAGNOSIS — E66.9 OBESITY (BMI 30-39.9): ICD-10-CM

## 2025-02-25 DIAGNOSIS — K76.0 HEPATIC STEATOSIS: ICD-10-CM

## 2025-02-25 DIAGNOSIS — E78.2 MIXED HYPERLIPIDEMIA: ICD-10-CM

## 2025-02-25 DIAGNOSIS — Z00.00 WELLNESS EXAMINATION: ICD-10-CM

## 2025-02-25 DIAGNOSIS — R73.01 IFG (IMPAIRED FASTING GLUCOSE): ICD-10-CM

## 2025-02-25 DIAGNOSIS — Z12.12 SCREENING FOR COLORECTAL CANCER: ICD-10-CM

## 2025-02-25 PROCEDURE — 3079F DIAST BP 80-89 MM HG: CPT

## 2025-02-25 PROCEDURE — 99396 PREV VISIT EST AGE 40-64: CPT

## 2025-02-25 PROCEDURE — 3075F SYST BP GE 130 - 139MM HG: CPT

## 2025-02-25 RX ORDER — ALLOPURINOL 100 MG/1
100 TABLET ORAL
Qty: 90 TABLET | Refills: 3 | Status: SHIPPED | OUTPATIENT
Start: 2025-02-25

## 2025-02-25 RX ORDER — LISINOPRIL 10 MG/1
10 TABLET ORAL
Qty: 90 TABLET | Refills: 3 | Status: SHIPPED | OUTPATIENT
Start: 2025-02-25

## 2025-02-25 ASSESSMENT — PATIENT HEALTH QUESTIONNAIRE - PHQ9: CLINICAL INTERPRETATION OF PHQ2 SCORE: 0

## 2025-02-26 NOTE — PROGRESS NOTES
Subjective:     CC:   Chief Complaint   Patient presents with    Medication Refill     Lisinopril allopurinol      Annual Exam       HPI:   Jae Cedillo is a 49 y.o. male who presents for an annual exam. He is feeling well and has no complaints. He would like his labs ordered and refills on all his medications.    Health Maintenance  Advanced directive: Yes   PT/vit D for falls prevention: no   Cholesterol Screening: ordered   Diabetes Screening: ordered     Diet: Trying to make improvements   Exercise: Sporadic, walks, bikes   Substance Abuse: No   Safe in relationship.   Seat belts, bike helmet, gun safety discussed.  Sun protection used.    Cancer screening  Colorectal Cancer Screening: ordered    Lung Cancer Screening: n/a    Prostate Cancer Screening/PSA: n/a     Infectious disease screening/Immunizations  --STI Screening: monogamous,declines  --Practices safe sex.  --HIV Screening: declines   --Hepatitis C Screening: utd   --Immunizations:    Influenza: out of stock    HPV:  aged out    Tetanus: declines    Shingles: n/a    Pneumococcal : n/a    COVID-19:   Other immunizations:      He  has a past medical history of Glaucoma, Gout, Hyperlipidemia, and Hypertension.  He  has a past surgical history that includes cholecystectomy and anal fistulotomy.  Family History   Problem Relation Age of Onset    Hypertension Mother     Cancer Mother     Cancer Father      Social History     Tobacco Use    Smoking status: Never    Smokeless tobacco: Never   Vaping Use    Vaping status: Never Used   Substance Use Topics    Alcohol use: Yes     Comment: 2-3 times a week    Drug use: Never       Patient Active Problem List    Diagnosis Date Noted    Obesity (BMI 30-39.9) 02/25/2025    Chronic idiopathic gout involving toe of right foot without tophus 11/08/2023    Hepatic steatosis 11/08/2023    History of lymphadenopathy 07/25/2022    Post-COVID syndrome resolved 07/25/2022    Dizziness 05/03/2022    Gastroesophageal  "reflux disease 04/07/2022    Mixed hyperlipidemia 12/15/2021    Blood glucose elevated 12/15/2021    Primary hypertension 11/22/2021    Abdominal wall hernia 11/22/2021       Current Outpatient Medications   Medication Sig Dispense Refill    lisinopril (PRINIVIL) 10 MG Tab Take 1 Tablet by mouth every day. 90 Tablet 3    allopurinol (ZYLOPRIM) 100 MG Tab Take 1 Tablet by mouth every day. 90 Tablet 3    albuterol (PROVENTIL) 2.5mg/3ml Nebu Soln solution for nebulization albuterol sulfate 2.5 mg/3 mL (0.083 %) solution for nebulization      albuterol 108 (90 Base) MCG/ACT Aero Soln inhalation aerosol albuterol sulfate HFA 90 mcg/actuation aerosol inhaler      Omega-3 Fatty Acids (FISH OIL PO) Take 1 Capsule by mouth every day.      omeprazole (PRILOSEC) 20 MG delayed-release capsule Take 20 mg by mouth 2 times a day.       No current facility-administered medications for this visit.    (including changes today)  Allergies: Aleve [naproxen], Other environmental, and Penicillins    Review of Systems   Constitutional: Negative for fever, chills and malaise/fatigue.   HENT: Negative for congestion.    Eyes: Negative for pain.   Respiratory: Negative for cough and shortness of breath.    Cardiovascular: Negative for leg swelling.   Gastrointestinal: Negative for nausea, vomiting, abdominal pain and diarrhea.   Genitourinary: Negative for dysuria and hematuria.   Skin: Negative for rash.   Neurological: Negative for dizziness, focal weakness and headaches.   Endo/Heme/Allergies: Does not bleed easily.   Psychiatric/Behavioral: Negative for depression.  The patient is not nervous/anxious.      Objective:     /80 (BP Location: Left arm, Patient Position: Sitting, BP Cuff Size: Adult)   Pulse 89   Temp 36.2 °C (97.2 °F) (Temporal)   Ht 1.803 m (5' 11\")   Wt 116 kg (256 lb 9.6 oz)   SpO2 96%   BMI 35.79 kg/m²   Body mass index is 35.79 kg/m².  Wt Readings from Last 4 Encounters:   02/25/25 116 kg (256 lb 9.6 oz) "   11/08/23 112 kg (246 lb 6.4 oz)   07/25/22 115 kg (254 lb 6.4 oz)   05/20/22 111 kg (244 lb)       Physical Exam:  Constitutional: Well-developed and well-nourished. Not diaphoretic. No distress.   Skin: Skin is warm and dry. No rash noted.  Head: Atraumatic without lesions.  Eyes: Conjunctivae and extraocular motions are normal. Pupils are equal, round, and reactive to light. No scleral icterus.   Ears:  External ears unremarkable. Tympanic membranes clear and intact.  Nose: Nares patent. Septum midline. Turbinates without erythema nor edema. No discharge.   Mouth/Throat: Dentition is . Tongue normal. Oropharynx is clear and moist. Posterior pharynx without erythema or exudates.  Neck: Supple, trachea midline. Normal range of motion. No thyromegaly present. No lymphadenopathy--cervical or supraclavicular.  Cardiovascular: Regular rate and rhythm, S1 and S2 without murmur, rubs, or gallops.    Lungs: Effort normal. Clear to auscultation throughout. No adventitious sounds. No CVA tenderness.  Abdomen: Soft, non tender, and without distention. Active bowel sounds in all four quadrants. No rebound, guarding, masses or HSM.  Extremities: No cyanosis, clubbing, erythema, nor edema. Distal pulses intact and symmetric.   Musculoskeletal: All major joints AROM full in all directions without pain.  Neurological: Alert and oriented x 3. DTRs 2+/3 and symmetric. No cranial nerve deficit. 5/5 myotomes. Sensation intact.  Psychiatric:  Behavior, mood, and affect are appropriate.        Assessment and Plan:     1. Wellness examination  Comp Metabolic Panel    Lipid Profile    HEMOGLOBIN A1C    URIC ACID, SERUM    lisinopril (PRINIVIL) 10 MG Tab    allopurinol (ZYLOPRIM) 100 MG Tab    Referral to GI for Colonoscopy    Patient identified as having weight management issue.  Appropriate orders and counseling given.      2. Primary hypertension  Comp Metabolic Panel    lisinopril (PRINIVIL) 10 MG Tab      3. Hepatic steatosis  Comp  Metabolic Panel      4. Mixed hyperlipidemia  Comp Metabolic Panel    Lipid Profile      5. Chronic idiopathic gout involving toe of right foot without tophus  URIC ACID, SERUM      6. IFG (impaired fasting glucose)  Comp Metabolic Panel    HEMOGLOBIN A1C      7. Elevated uric acid in blood  allopurinol (ZYLOPRIM) 100 MG Tab      8. Screening for colorectal cancer  Referral to GI for Colonoscopy      9. Obesity (BMI 30-39.9)  Patient identified as having weight management issue.  Appropriate orders and counseling given.          HCM: Declines.  Labs per orders.  Vaccinations per orders.  Anticipatory Guidance/Counseling about diet, supplements, exercise, skin care and safe sex.    Follow-up: Return in about 1 year (around 2/25/2026), or if symptoms worsen or fail to improve.

## 2025-02-28 NOTE — Clinical Note
REFERRAL APPROVAL NOTICE         Sent on February 27, 2025                   Jae Cedillo  [address]  [place] [address]                   Dear Mr. Cedillo,    After a careful review of the medical information and benefit coverage, [facility] has processed your referral. See below for additional details.    If applicable, you must be actively enrolled with your insurance for coverage of the authorized service. If you have any questions regarding your coverage, please contact your insurance directly.    REFERRAL INFORMATION   Referral #:  [id number]  Referred-To Provider    Referred-By Provider:  Gastroenterology    Jarad Pineda D.N.P.   [facility]      [address]  [place] [address] [phone] [address] [facility] [place] [address]  [phone]    Referral Start Date:  02/25/2025  Referral End Date:   02/25/2026             SCHEDULING  If you do not already have an appointment, please call [phone] to make an appointment.     MORE INFORMATION  If you do not already have a [employer] account, sign up at: [email or web address]  You can access your medical information, make appointments, see lab results, billing information, and more.  If you have questions regarding this referral, please contact  the [facility] Referrals department at:             [phone]. Monday - Friday 8:00AM - 5:00PM.     Sincerely,    [facility]